# Patient Record
Sex: FEMALE | Race: WHITE | NOT HISPANIC OR LATINO
[De-identification: names, ages, dates, MRNs, and addresses within clinical notes are randomized per-mention and may not be internally consistent; named-entity substitution may affect disease eponyms.]

---

## 2017-12-19 ENCOUNTER — LABORATORY RESULT (OUTPATIENT)
Age: 52
End: 2017-12-19

## 2017-12-19 ENCOUNTER — MOBILE ON CALL (OUTPATIENT)
Age: 52
End: 2017-12-19

## 2017-12-19 ENCOUNTER — APPOINTMENT (OUTPATIENT)
Dept: DERMATOLOGY | Facility: CLINIC | Age: 52
End: 2017-12-19
Payer: COMMERCIAL

## 2017-12-19 VITALS — DIASTOLIC BLOOD PRESSURE: 60 MMHG | SYSTOLIC BLOOD PRESSURE: 112 MMHG

## 2017-12-19 DIAGNOSIS — D48.5 NEOPLASM OF UNCERTAIN BEHAVIOR OF SKIN: ICD-10-CM

## 2017-12-19 DIAGNOSIS — L82.1 OTHER SEBORRHEIC KERATOSIS: ICD-10-CM

## 2017-12-19 DIAGNOSIS — L81.4 OTHER MELANIN HYPERPIGMENTATION: ICD-10-CM

## 2017-12-19 PROCEDURE — 11100 BX SKIN SUBCUTANEOUS&/MUCOUS MEMBRANE 1 LESION: CPT

## 2017-12-19 PROCEDURE — 99214 OFFICE O/P EST MOD 30 MIN: CPT | Mod: 25

## 2018-01-22 ENCOUNTER — APPOINTMENT (OUTPATIENT)
Dept: DERMATOLOGY | Facility: CLINIC | Age: 53
End: 2018-01-22
Payer: COMMERCIAL

## 2018-01-22 PROCEDURE — 99214 OFFICE O/P EST MOD 30 MIN: CPT

## 2018-01-26 ENCOUNTER — TRANSCRIPTION ENCOUNTER (OUTPATIENT)
Age: 53
End: 2018-01-26

## 2018-01-29 ENCOUNTER — APPOINTMENT (OUTPATIENT)
Dept: SPINE | Facility: CLINIC | Age: 53
End: 2018-01-29
Payer: COMMERCIAL

## 2018-01-29 VITALS
HEIGHT: 60 IN | BODY MASS INDEX: 23.16 KG/M2 | WEIGHT: 118 LBS | SYSTOLIC BLOOD PRESSURE: 110 MMHG | DIASTOLIC BLOOD PRESSURE: 60 MMHG

## 2018-01-29 PROCEDURE — 99204 OFFICE O/P NEW MOD 45 MIN: CPT

## 2018-01-29 RX ORDER — BACILLUS COAGULANS/INULIN 1B-250 MG
CAPSULE ORAL
Refills: 0 | Status: ACTIVE | COMMUNITY

## 2018-01-30 ENCOUNTER — MOBILE ON CALL (OUTPATIENT)
Age: 53
End: 2018-01-30

## 2018-01-30 ENCOUNTER — APPOINTMENT (OUTPATIENT)
Dept: DERMATOLOGY | Facility: CLINIC | Age: 53
End: 2018-01-30
Payer: COMMERCIAL

## 2018-01-30 PROCEDURE — 17311 MOHS 1 STAGE H/N/HF/G: CPT

## 2018-01-30 PROCEDURE — 17312 MOHS ADDL STAGE: CPT

## 2018-02-08 ENCOUNTER — CLINICAL ADVICE (OUTPATIENT)
Age: 53
End: 2018-02-08

## 2018-02-13 ENCOUNTER — OUTPATIENT (OUTPATIENT)
Dept: OUTPATIENT SERVICES | Facility: HOSPITAL | Age: 53
LOS: 1 days | End: 2018-02-13
Payer: COMMERCIAL

## 2018-02-13 ENCOUNTER — APPOINTMENT (OUTPATIENT)
Dept: MRI IMAGING | Facility: CLINIC | Age: 53
End: 2018-02-13
Payer: COMMERCIAL

## 2018-02-13 DIAGNOSIS — D49.2 NEOPLASM OF UNSPECIFIED BEHAVIOR OF BONE, SOFT TISSUE, AND SKIN: ICD-10-CM

## 2018-02-13 DIAGNOSIS — Z00.8 ENCOUNTER FOR OTHER GENERAL EXAMINATION: ICD-10-CM

## 2018-02-13 PROCEDURE — A9585: CPT

## 2018-02-13 PROCEDURE — 70546 MR ANGIOGRAPH HEAD W/O&W/DYE: CPT | Mod: 26

## 2018-02-13 PROCEDURE — 70546 MR ANGIOGRAPH HEAD W/O&W/DYE: CPT

## 2018-02-13 PROCEDURE — 70544 MR ANGIOGRAPHY HEAD W/O DYE: CPT

## 2018-02-14 ENCOUNTER — OUTPATIENT (OUTPATIENT)
Dept: OUTPATIENT SERVICES | Facility: HOSPITAL | Age: 53
LOS: 1 days | End: 2018-02-14
Payer: COMMERCIAL

## 2018-02-14 VITALS
WEIGHT: 117.07 LBS | SYSTOLIC BLOOD PRESSURE: 110 MMHG | OXYGEN SATURATION: 98 % | HEART RATE: 78 BPM | RESPIRATION RATE: 16 BRPM | HEIGHT: 60 IN | TEMPERATURE: 97 F | DIASTOLIC BLOOD PRESSURE: 60 MMHG

## 2018-02-14 DIAGNOSIS — D49.7 NEOPLASM OF UNSPECIFIED BEHAVIOR OF ENDOCRINE GLANDS AND OTHER PARTS OF NERVOUS SYSTEM: ICD-10-CM

## 2018-02-14 DIAGNOSIS — R07.9 CHEST PAIN, UNSPECIFIED: ICD-10-CM

## 2018-02-14 DIAGNOSIS — Z98.891 HISTORY OF UTERINE SCAR FROM PREVIOUS SURGERY: Chronic | ICD-10-CM

## 2018-02-14 DIAGNOSIS — Z01.818 ENCOUNTER FOR OTHER PREPROCEDURAL EXAMINATION: ICD-10-CM

## 2018-02-14 DIAGNOSIS — D49.2 NEOPLASM OF UNSPECIFIED BEHAVIOR OF BONE, SOFT TISSUE, AND SKIN: ICD-10-CM

## 2018-02-14 LAB
BLD GP AB SCN SERPL QL: NEGATIVE — SIGNIFICANT CHANGE UP
HCT VFR BLD CALC: 40.4 % — SIGNIFICANT CHANGE UP (ref 34.5–45)
HGB BLD-MCNC: 13.7 G/DL — SIGNIFICANT CHANGE UP (ref 11.5–15.5)
MCHC RBC-ENTMCNC: 29.5 PG — SIGNIFICANT CHANGE UP (ref 27–34)
MCHC RBC-ENTMCNC: 33.9 GM/DL — SIGNIFICANT CHANGE UP (ref 32–36)
MCV RBC AUTO: 86.9 FL — SIGNIFICANT CHANGE UP (ref 80–100)
MRSA PCR RESULT.: SIGNIFICANT CHANGE UP
PLATELET # BLD AUTO: 308 K/UL — SIGNIFICANT CHANGE UP (ref 150–400)
RBC # BLD: 4.65 M/UL — SIGNIFICANT CHANGE UP (ref 3.8–5.2)
RBC # FLD: 13.4 % — SIGNIFICANT CHANGE UP (ref 10.3–14.5)
RH IG SCN BLD-IMP: POSITIVE — SIGNIFICANT CHANGE UP
S AUREUS DNA NOSE QL NAA+PROBE: SIGNIFICANT CHANGE UP
WBC # BLD: 6.68 K/UL — SIGNIFICANT CHANGE UP (ref 3.8–10.5)
WBC # FLD AUTO: 6.68 K/UL — SIGNIFICANT CHANGE UP (ref 3.8–10.5)

## 2018-02-14 PROCEDURE — 86901 BLOOD TYPING SEROLOGIC RH(D): CPT

## 2018-02-14 PROCEDURE — 87641 MR-STAPH DNA AMP PROBE: CPT

## 2018-02-14 PROCEDURE — 86850 RBC ANTIBODY SCREEN: CPT

## 2018-02-14 PROCEDURE — 85027 COMPLETE CBC AUTOMATED: CPT

## 2018-02-14 PROCEDURE — 87640 STAPH A DNA AMP PROBE: CPT

## 2018-02-14 PROCEDURE — 86900 BLOOD TYPING SEROLOGIC ABO: CPT

## 2018-02-14 PROCEDURE — G0463: CPT

## 2018-02-14 NOTE — H&P PST ADULT - HISTORY OF PRESENT ILLNESS
52 year old female with c/o vertigo, headaches, SOB, fatigue, pain under both breasts for the past 2 1/2 months was seen by PCP, ENT and neuro. 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 Now presents for removal of thoracic spinal cord tumor. PCP recommended a cardiology work up for the fatigue SOB and intermittent chest pain which was done on 2/12/18, awaiting results. 52 year old female with c/o vertigo, headaches, SOB, fatigue, pain under both breasts for the past 2 1/2 months was seen by PCP, ENT and neuro. 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 Now presents for removal of thoracic spinal cord tumor. PCP recommended a cardiology work up for the fatigue SOB and intermittent chest pain which was done on 2/12/18, awaiting results.

## 2018-02-14 NOTE — H&P PST ADULT - PMH
Basal cell carcinoma (BCC) in situ of skin  excised from ear in dermatologist office 1/2018  Chest pain  after activity, started in 1/18, had cardiology workup 2/12/18, awaiting results  Diverticulitis  hospitalized in 2010, no further episodes  Dry eye

## 2018-02-14 NOTE — H&P PST ADULT - NSANTHOSAYNRD_GEN_A_CORE
No. KEITH screening performed.  STOP BANG Legend: 0-2 = LOW Risk; 3-4 = INTERMEDIATE Risk; 5-8 = HIGH Risk

## 2018-02-14 NOTE — H&P PST ADULT - PROBLEM SELECTOR PLAN 1
T7-T8 thoracic laminectomy, removal of spinal tumor thoracic laminectomies, removal of T6-T7 spinal tumor

## 2018-02-21 ENCOUNTER — TRANSCRIPTION ENCOUNTER (OUTPATIENT)
Age: 53
End: 2018-02-21

## 2018-02-21 ENCOUNTER — APPOINTMENT (OUTPATIENT)
Dept: SPINE | Facility: HOSPITAL | Age: 53
End: 2018-02-21

## 2018-02-21 ENCOUNTER — APPOINTMENT (OUTPATIENT)
Dept: RADIOLOGY | Facility: HOSPITAL | Age: 53
End: 2018-02-21

## 2018-02-21 ENCOUNTER — INPATIENT (INPATIENT)
Facility: HOSPITAL | Age: 53
LOS: 3 days | Discharge: HOME | DRG: 29 | End: 2018-02-25
Attending: NEUROLOGICAL SURGERY | Admitting: NEUROLOGICAL SURGERY
Payer: COMMERCIAL

## 2018-02-21 ENCOUNTER — RESULT REVIEW (OUTPATIENT)
Age: 53
End: 2018-02-21

## 2018-02-21 VITALS
TEMPERATURE: 98 F | WEIGHT: 117.07 LBS | RESPIRATION RATE: 18 BRPM | HEIGHT: 60 IN | OXYGEN SATURATION: 100 % | DIASTOLIC BLOOD PRESSURE: 61 MMHG | HEART RATE: 63 BPM | SYSTOLIC BLOOD PRESSURE: 101 MMHG

## 2018-02-21 DIAGNOSIS — D49.2 NEOPLASM OF UNSPECIFIED BEHAVIOR OF BONE, SOFT TISSUE, AND SKIN: ICD-10-CM

## 2018-02-21 DIAGNOSIS — Z98.891 HISTORY OF UTERINE SCAR FROM PREVIOUS SURGERY: Chronic | ICD-10-CM

## 2018-02-21 LAB
HCG UR QL: NEGATIVE — SIGNIFICANT CHANGE UP
RH IG SCN BLD-IMP: POSITIVE — SIGNIFICANT CHANGE UP

## 2018-02-21 PROCEDURE — 63281 BX/EXC IDRL SPINE LESN THRC: CPT

## 2018-02-21 PROCEDURE — 88307 TISSUE EXAM BY PATHOLOGIST: CPT | Mod: 26

## 2018-02-21 PROCEDURE — 22899 UNLISTED PROCEDURE SPINE: CPT

## 2018-02-21 PROCEDURE — 77003 FLUOROGUIDE FOR SPINE INJECT: CPT | Mod: 26

## 2018-02-21 PROCEDURE — 72020 X-RAY EXAM OF SPINE 1 VIEW: CPT | Mod: 26

## 2018-02-21 PROCEDURE — 69990 MICROSURGERY ADD-ON: CPT

## 2018-02-21 PROCEDURE — 88360 TUMOR IMMUNOHISTOCHEM/MANUAL: CPT | Mod: 26

## 2018-02-21 RX ORDER — HYDROMORPHONE HYDROCHLORIDE 2 MG/ML
1 INJECTION INTRAMUSCULAR; INTRAVENOUS; SUBCUTANEOUS
Qty: 0 | Refills: 0 | Status: DISCONTINUED | OUTPATIENT
Start: 2018-02-21 | End: 2018-02-23

## 2018-02-21 RX ORDER — SODIUM CHLORIDE 9 MG/ML
3 INJECTION INTRAMUSCULAR; INTRAVENOUS; SUBCUTANEOUS EVERY 8 HOURS
Qty: 0 | Refills: 0 | Status: DISCONTINUED | OUTPATIENT
Start: 2018-02-21 | End: 2018-02-21

## 2018-02-21 RX ORDER — DIAZEPAM 5 MG
5 TABLET ORAL EVERY 6 HOURS
Qty: 0 | Refills: 0 | Status: DISCONTINUED | OUTPATIENT
Start: 2018-02-21 | End: 2018-02-24

## 2018-02-21 RX ORDER — ACETAMINOPHEN 500 MG
650 TABLET ORAL EVERY 6 HOURS
Qty: 0 | Refills: 0 | Status: DISCONTINUED | OUTPATIENT
Start: 2018-02-21 | End: 2018-02-25

## 2018-02-21 RX ORDER — PROCHLORPERAZINE MALEATE 5 MG
6.25 TABLET ORAL ONCE
Qty: 0 | Refills: 0 | Status: COMPLETED | OUTPATIENT
Start: 2018-02-21 | End: 2018-02-21

## 2018-02-21 RX ORDER — OXYCODONE AND ACETAMINOPHEN 5; 325 MG/1; MG/1
1 TABLET ORAL EVERY 4 HOURS
Qty: 0 | Refills: 0 | Status: DISCONTINUED | OUTPATIENT
Start: 2018-02-21 | End: 2018-02-25

## 2018-02-21 RX ORDER — POLYETHYLENE GLYCOL 3350 17 G/17G
17 POWDER, FOR SOLUTION ORAL DAILY
Qty: 0 | Refills: 0 | Status: DISCONTINUED | OUTPATIENT
Start: 2018-02-21 | End: 2018-02-23

## 2018-02-21 RX ORDER — HYDROMORPHONE HYDROCHLORIDE 2 MG/ML
0.5 INJECTION INTRAMUSCULAR; INTRAVENOUS; SUBCUTANEOUS
Qty: 0 | Refills: 0 | Status: DISCONTINUED | OUTPATIENT
Start: 2018-02-21 | End: 2018-02-22

## 2018-02-21 RX ORDER — SENNA PLUS 8.6 MG/1
2 TABLET ORAL AT BEDTIME
Qty: 0 | Refills: 0 | Status: DISCONTINUED | OUTPATIENT
Start: 2018-02-21 | End: 2018-02-25

## 2018-02-21 RX ORDER — FAMOTIDINE 10 MG/ML
20 INJECTION INTRAVENOUS ONCE
Qty: 0 | Refills: 0 | Status: COMPLETED | OUTPATIENT
Start: 2018-02-21 | End: 2018-02-21

## 2018-02-21 RX ORDER — L.ACIDOPH/B.ANIMALIS/B.LONGUM 15B CELL
1 CAPSULE ORAL
Qty: 0 | Refills: 0 | COMMUNITY

## 2018-02-21 RX ORDER — DEXAMETHASONE 0.5 MG/5ML
4 ELIXIR ORAL EVERY 6 HOURS
Qty: 0 | Refills: 0 | Status: COMPLETED | OUTPATIENT
Start: 2018-02-21 | End: 2018-02-22

## 2018-02-21 RX ORDER — LIDOCAINE HCL 20 MG/ML
0.2 VIAL (ML) INJECTION ONCE
Qty: 0 | Refills: 0 | Status: DISCONTINUED | OUTPATIENT
Start: 2018-02-21 | End: 2018-02-21

## 2018-02-21 RX ORDER — ONDANSETRON 8 MG/1
4 TABLET, FILM COATED ORAL ONCE
Qty: 0 | Refills: 0 | Status: COMPLETED | OUTPATIENT
Start: 2018-02-21 | End: 2018-02-21

## 2018-02-21 RX ORDER — DEXAMETHASONE 0.5 MG/5ML
4 ELIXIR ORAL ONCE
Qty: 0 | Refills: 0 | Status: COMPLETED | OUTPATIENT
Start: 2018-02-21 | End: 2018-02-21

## 2018-02-21 RX ORDER — DIPHENHYDRAMINE HCL 50 MG
12.5 CAPSULE ORAL ONCE
Qty: 0 | Refills: 0 | Status: COMPLETED | OUTPATIENT
Start: 2018-02-21 | End: 2018-02-21

## 2018-02-21 RX ORDER — OXYCODONE AND ACETAMINOPHEN 5; 325 MG/1; MG/1
2 TABLET ORAL EVERY 6 HOURS
Qty: 0 | Refills: 0 | Status: DISCONTINUED | OUTPATIENT
Start: 2018-02-21 | End: 2018-02-25

## 2018-02-21 RX ORDER — CEFAZOLIN SODIUM 1 G
2000 VIAL (EA) INJECTION EVERY 8 HOURS
Qty: 0 | Refills: 0 | Status: COMPLETED | OUTPATIENT
Start: 2018-02-21 | End: 2018-02-22

## 2018-02-21 RX ORDER — FAMOTIDINE 10 MG/ML
20 INJECTION INTRAVENOUS EVERY 12 HOURS
Qty: 0 | Refills: 0 | Status: DISCONTINUED | OUTPATIENT
Start: 2018-02-21 | End: 2018-02-25

## 2018-02-21 RX ORDER — LIFITEGRAST 50 MG/ML
1 SOLUTION/ DROPS OPHTHALMIC
Qty: 0 | Refills: 0 | COMMUNITY

## 2018-02-21 RX ORDER — DOCUSATE SODIUM 100 MG
100 CAPSULE ORAL THREE TIMES A DAY
Qty: 0 | Refills: 0 | Status: DISCONTINUED | OUTPATIENT
Start: 2018-02-21 | End: 2018-02-25

## 2018-02-21 RX ORDER — DEXTROSE MONOHYDRATE, SODIUM CHLORIDE, AND POTASSIUM CHLORIDE 50; .745; 4.5 G/1000ML; G/1000ML; G/1000ML
1000 INJECTION, SOLUTION INTRAVENOUS
Qty: 0 | Refills: 0 | Status: DISCONTINUED | OUTPATIENT
Start: 2018-02-21 | End: 2018-02-22

## 2018-02-21 RX ORDER — CEFAZOLIN SODIUM 1 G
2000 VIAL (EA) INJECTION ONCE
Qty: 0 | Refills: 0 | Status: COMPLETED | OUTPATIENT
Start: 2018-02-21 | End: 2018-02-21

## 2018-02-21 RX ORDER — ASCORBIC ACID 60 MG
500 TABLET,CHEWABLE ORAL
Qty: 0 | Refills: 0 | Status: DISCONTINUED | OUTPATIENT
Start: 2018-02-21 | End: 2018-02-25

## 2018-02-21 RX ORDER — MAGNESIUM HYDROXIDE 400 MG/1
30 TABLET, CHEWABLE ORAL EVERY 12 HOURS
Qty: 0 | Refills: 0 | Status: DISCONTINUED | OUTPATIENT
Start: 2018-02-21 | End: 2018-02-25

## 2018-02-21 RX ORDER — ONDANSETRON 8 MG/1
4 TABLET, FILM COATED ORAL EVERY 6 HOURS
Qty: 0 | Refills: 0 | Status: DISCONTINUED | OUTPATIENT
Start: 2018-02-21 | End: 2018-02-25

## 2018-02-21 RX ADMIN — Medication 102.5 MILLIGRAM(S): at 21:50

## 2018-02-21 RX ADMIN — SENNA PLUS 2 TABLET(S): 8.6 TABLET ORAL at 23:58

## 2018-02-21 RX ADMIN — ONDANSETRON 4 MILLIGRAM(S): 8 TABLET, FILM COATED ORAL at 18:56

## 2018-02-21 RX ADMIN — HYDROMORPHONE HYDROCHLORIDE 0.5 MILLIGRAM(S): 2 INJECTION INTRAMUSCULAR; INTRAVENOUS; SUBCUTANEOUS at 22:20

## 2018-02-21 RX ADMIN — Medication 100 MILLIGRAM(S): at 23:57

## 2018-02-21 RX ADMIN — HYDROMORPHONE HYDROCHLORIDE 0.5 MILLIGRAM(S): 2 INJECTION INTRAMUSCULAR; INTRAVENOUS; SUBCUTANEOUS at 22:35

## 2018-02-21 RX ADMIN — ONDANSETRON 4 MILLIGRAM(S): 8 TABLET, FILM COATED ORAL at 19:41

## 2018-02-21 RX ADMIN — DEXTROSE MONOHYDRATE, SODIUM CHLORIDE, AND POTASSIUM CHLORIDE 75 MILLILITER(S): 50; .745; 4.5 INJECTION, SOLUTION INTRAVENOUS at 20:32

## 2018-02-21 RX ADMIN — Medication 12.5 MILLIGRAM(S): at 19:42

## 2018-02-21 RX ADMIN — Medication 4 MILLIGRAM(S): at 20:44

## 2018-02-21 RX ADMIN — FAMOTIDINE 20 MILLIGRAM(S): 10 INJECTION INTRAVENOUS at 20:30

## 2018-02-21 NOTE — BRIEF OPERATIVE NOTE - PROCEDURE
<<-----Click on this checkbox to enter Procedure Laminectomy for excision of intradural extramedullary intraspinal neoplasm  02/21/2018  T6,7,8 lami resection of meningioma  Active  NICK

## 2018-02-22 LAB
ANION GAP SERPL CALC-SCNC: 12 MMOL/L — SIGNIFICANT CHANGE UP (ref 5–17)
BASOPHILS # BLD AUTO: 0 K/UL — SIGNIFICANT CHANGE UP (ref 0–0.2)
BASOPHILS NFR BLD AUTO: 0 % — SIGNIFICANT CHANGE UP (ref 0–2)
BUN SERPL-MCNC: 11 MG/DL — SIGNIFICANT CHANGE UP (ref 7–23)
CALCIUM SERPL-MCNC: 7.6 MG/DL — LOW (ref 8.4–10.5)
CHLORIDE SERPL-SCNC: 108 MMOL/L — SIGNIFICANT CHANGE UP (ref 96–108)
CO2 SERPL-SCNC: 21 MMOL/L — LOW (ref 22–31)
CREAT SERPL-MCNC: 0.67 MG/DL — SIGNIFICANT CHANGE UP (ref 0.5–1.3)
EOSINOPHIL # BLD AUTO: 0 K/UL — SIGNIFICANT CHANGE UP (ref 0–0.5)
EOSINOPHIL NFR BLD AUTO: 0 % — SIGNIFICANT CHANGE UP (ref 0–6)
GLUCOSE SERPL-MCNC: 126 MG/DL — HIGH (ref 70–99)
HCT VFR BLD CALC: 29.6 % — LOW (ref 34.5–45)
HGB BLD-MCNC: 10.4 G/DL — LOW (ref 11.5–15.5)
LYMPHOCYTES # BLD AUTO: 0.7 K/UL — LOW (ref 1–3.3)
LYMPHOCYTES # BLD AUTO: 5.5 % — LOW (ref 13–44)
MCHC RBC-ENTMCNC: 31.6 PG — SIGNIFICANT CHANGE UP (ref 27–34)
MCHC RBC-ENTMCNC: 35.1 GM/DL — SIGNIFICANT CHANGE UP (ref 32–36)
MCV RBC AUTO: 90 FL — SIGNIFICANT CHANGE UP (ref 80–100)
MONOCYTES # BLD AUTO: 0.6 K/UL — SIGNIFICANT CHANGE UP (ref 0–0.9)
MONOCYTES NFR BLD AUTO: 4.5 % — SIGNIFICANT CHANGE UP (ref 2–14)
NEUTROPHILS # BLD AUTO: 11.7 K/UL — HIGH (ref 1.8–7.4)
NEUTROPHILS NFR BLD AUTO: 90 % — HIGH (ref 43–77)
PLATELET # BLD AUTO: 268 K/UL — SIGNIFICANT CHANGE UP (ref 150–400)
POTASSIUM SERPL-MCNC: 4.1 MMOL/L — SIGNIFICANT CHANGE UP (ref 3.5–5.3)
POTASSIUM SERPL-SCNC: 4.1 MMOL/L — SIGNIFICANT CHANGE UP (ref 3.5–5.3)
RBC # BLD: 3.29 M/UL — LOW (ref 3.8–5.2)
RBC # FLD: 12 % — SIGNIFICANT CHANGE UP (ref 10.3–14.5)
SODIUM SERPL-SCNC: 141 MMOL/L — SIGNIFICANT CHANGE UP (ref 135–145)
WBC # BLD: 13 K/UL — HIGH (ref 3.8–10.5)
WBC # FLD AUTO: 13 K/UL — HIGH (ref 3.8–10.5)

## 2018-02-22 RX ORDER — ENOXAPARIN SODIUM 100 MG/ML
40 INJECTION SUBCUTANEOUS EVERY 24 HOURS
Qty: 0 | Refills: 0 | Status: DISCONTINUED | OUTPATIENT
Start: 2018-02-22 | End: 2018-02-25

## 2018-02-22 RX ADMIN — HYDROMORPHONE HYDROCHLORIDE 1 MILLIGRAM(S): 2 INJECTION INTRAMUSCULAR; INTRAVENOUS; SUBCUTANEOUS at 11:15

## 2018-02-22 RX ADMIN — Medication 4 MILLIGRAM(S): at 21:50

## 2018-02-22 RX ADMIN — ENOXAPARIN SODIUM 40 MILLIGRAM(S): 100 INJECTION SUBCUTANEOUS at 12:03

## 2018-02-22 RX ADMIN — Medication 100 MILLIGRAM(S): at 07:01

## 2018-02-22 RX ADMIN — OXYCODONE AND ACETAMINOPHEN 2 TABLET(S): 5; 325 TABLET ORAL at 15:54

## 2018-02-22 RX ADMIN — OXYCODONE AND ACETAMINOPHEN 2 TABLET(S): 5; 325 TABLET ORAL at 21:49

## 2018-02-22 RX ADMIN — HYDROMORPHONE HYDROCHLORIDE 1 MILLIGRAM(S): 2 INJECTION INTRAMUSCULAR; INTRAVENOUS; SUBCUTANEOUS at 01:45

## 2018-02-22 RX ADMIN — Medication 100 MILLIGRAM(S): at 00:05

## 2018-02-22 RX ADMIN — OXYCODONE AND ACETAMINOPHEN 2 TABLET(S): 5; 325 TABLET ORAL at 16:30

## 2018-02-22 RX ADMIN — Medication 5 MILLIGRAM(S): at 00:05

## 2018-02-22 RX ADMIN — Medication 500 MILLIGRAM(S): at 17:56

## 2018-02-22 RX ADMIN — HYDROMORPHONE HYDROCHLORIDE 1 MILLIGRAM(S): 2 INJECTION INTRAMUSCULAR; INTRAVENOUS; SUBCUTANEOUS at 11:00

## 2018-02-22 RX ADMIN — HYDROMORPHONE HYDROCHLORIDE 1 MILLIGRAM(S): 2 INJECTION INTRAMUSCULAR; INTRAVENOUS; SUBCUTANEOUS at 06:40

## 2018-02-22 RX ADMIN — HYDROMORPHONE HYDROCHLORIDE 1 MILLIGRAM(S): 2 INJECTION INTRAMUSCULAR; INTRAVENOUS; SUBCUTANEOUS at 02:00

## 2018-02-22 RX ADMIN — Medication 4 MILLIGRAM(S): at 07:01

## 2018-02-22 RX ADMIN — Medication 4 MILLIGRAM(S): at 13:18

## 2018-02-22 RX ADMIN — HYDROMORPHONE HYDROCHLORIDE 1 MILLIGRAM(S): 2 INJECTION INTRAMUSCULAR; INTRAVENOUS; SUBCUTANEOUS at 06:25

## 2018-02-22 RX ADMIN — Medication 4 MILLIGRAM(S): at 02:39

## 2018-02-22 RX ADMIN — Medication 500 MILLIGRAM(S): at 06:33

## 2018-02-22 RX ADMIN — Medication 100 MILLIGRAM(S): at 21:49

## 2018-02-22 RX ADMIN — Medication 5 MILLIGRAM(S): at 05:37

## 2018-02-22 RX ADMIN — Medication 100 MILLIGRAM(S): at 13:18

## 2018-02-22 RX ADMIN — SENNA PLUS 2 TABLET(S): 8.6 TABLET ORAL at 21:49

## 2018-02-22 NOTE — PROGRESS NOTE ADULT - SUBJECTIVE AND OBJECTIVE BOX
HPI:  Patient is a 52 year old female who was found to have a T7-T8 spinal cord tumor on recent MRI.  Presents for surgery.    OVERNIGHT EVENTS:  No acute events overnight.  Having some incisional pain which is controlled on current regimen.  Patient in pacu, awaiting floor bed.  Has not been out of bed yet.  To start diet this AM.  Awaiting MRI spine.    Vital Signs Last 24 Hrs  T(C): 36.8 (22 Feb 2018 07:30), Max: 37 (21 Feb 2018 22:00)  T(F): 98.2 (22 Feb 2018 07:30), Max: 98.6 (21 Feb 2018 22:00)  HR: 64 (22 Feb 2018 07:30) (61 - 86)  BP: 99/55 (22 Feb 2018 07:30) (73/36 - 107/53)  BP(mean): 69 (22 Feb 2018 07:30) (46 - 77)  RR: 15 (22 Feb 2018 07:30) (13 - 16)  SpO2: 100% (22 Feb 2018 07:30) (98% - 100%)      PHYSICAL EXAM:  Neurological: awake, alert, oriented x3, follows commands, speech clear and fluent, moves all extremities x4 w/ 5/5 strength throughout, sensation present, intact, equal throughout    Cardiovascular: +s1, s2  Respiratory: clear to auscultation b/l  Gastrointestinal: soft, non-distended, non-tender  Genitourinary: +li  Extremities: +dp/pt pulses palpable b/l  Incision/Wound: posterior vertical midline incision c/d/i w/ dermabond/prineo    TUBES/LINES:  [x] +li    DIET:  [x] clear liquids    LABS:                        10.4   13.0  )-----------( 268      ( 22 Feb 2018 02:42 )             29.6     02-22    141  |  108  |  11  ----------------------------<  126<H>  4.1   |  21<L>  |  0.67    Ca    7.6<L>      22 Feb 2018 02:42      Allergies    No Known Allergies      MEDICATIONS:  Antibiotics: none    Neuro:  acetaminophen   Tablet 650 milliGRAM(s) Oral every 6 hours PRN  acetaminophen   Tablet. 650 milliGRAM(s) Oral every 6 hours PRN  diazepam    Tablet 5 milliGRAM(s) Oral every 6 hours  HYDROmorphone  Injectable 1 milliGRAM(s) SubCutaneous every 3 hours PRN  HYDROmorphone  Injectable 0.5 milliGRAM(s) IV Push every 10 minutes PRN  ondansetron Injectable 4 milliGRAM(s) IV Push every 6 hours PRN  oxyCODONE    5 mG/acetaminophen 325 mG 1 Tablet(s) Oral every 4 hours PRN  oxyCODONE    5 mG/acetaminophen 325 mG 2 Tablet(s) Oral every 6 hours PRN    Anticoagulation:  enoxaparin Injectable 40 milliGRAM(s) SubCutaneous every 24 hours    OTHER:  dexamethasone     Tablet 4 milliGRAM(s) Oral every 6 hours  docusate sodium 100 milliGRAM(s) Oral three times a day  famotidine    Tablet 20 milliGRAM(s) Oral every 12 hours PRN  magnesium hydroxide Suspension 30 milliLiter(s) Oral every 12 hours PRN  polyethylene glycol 3350 17 Gram(s) Oral daily PRN  senna 2 Tablet(s) Oral at bedtime    IVF:  ascorbic acid 500 milliGRAM(s) Oral two times a day  multivitamin 1 Tablet(s) Oral daily  sodium chloride 0.9% with potassium chloride 20 mEq/L 1000 milliLiter(s) IV Continuous <Continuous>    CULTURES:    RADIOLOGY & ADDITIONAL TESTS: HPI:  Patient is a 52 year old female who was found to have a T6-T7 spinal cord tumor on recent MRI.  Presents for surgery.    OVERNIGHT EVENTS:  No acute events overnight.  Having some incisional pain which is controlled on current regimen.  Patient in pacu, awaiting floor bed.  Has not been out of bed yet.  To start diet this AM.  Awaiting MRI spine.    Vital Signs Last 24 Hrs  T(C): 36.8 (22 Feb 2018 07:30), Max: 37 (21 Feb 2018 22:00)  T(F): 98.2 (22 Feb 2018 07:30), Max: 98.6 (21 Feb 2018 22:00)  HR: 64 (22 Feb 2018 07:30) (61 - 86)  BP: 99/55 (22 Feb 2018 07:30) (73/36 - 107/53)  BP(mean): 69 (22 Feb 2018 07:30) (46 - 77)  RR: 15 (22 Feb 2018 07:30) (13 - 16)  SpO2: 100% (22 Feb 2018 07:30) (98% - 100%)      PHYSICAL EXAM:  Neurological: awake, alert, oriented x3, follows commands, speech clear and fluent, moves all extremities x4 w/ 5/5 strength throughout, sensation present, intact, equal throughout    Cardiovascular: +s1, s2  Respiratory: clear to auscultation b/l  Gastrointestinal: soft, non-distended, non-tender  Genitourinary: +li  Extremities: +dp/pt pulses palpable b/l  Incision/Wound: posterior vertical midline incision c/d/i w/ dermabond/prineo    TUBES/LINES:  [x] +li    DIET:  [x] clear liquids    LABS:                        10.4   13.0  )-----------( 268      ( 22 Feb 2018 02:42 )             29.6     02-22    141  |  108  |  11  ----------------------------<  126<H>  4.1   |  21<L>  |  0.67    Ca    7.6<L>      22 Feb 2018 02:42      Allergies    No Known Allergies      MEDICATIONS:  Antibiotics: none    Neuro:  acetaminophen   Tablet 650 milliGRAM(s) Oral every 6 hours PRN  acetaminophen   Tablet. 650 milliGRAM(s) Oral every 6 hours PRN  diazepam    Tablet 5 milliGRAM(s) Oral every 6 hours  HYDROmorphone  Injectable 1 milliGRAM(s) SubCutaneous every 3 hours PRN  HYDROmorphone  Injectable 0.5 milliGRAM(s) IV Push every 10 minutes PRN  ondansetron Injectable 4 milliGRAM(s) IV Push every 6 hours PRN  oxyCODONE    5 mG/acetaminophen 325 mG 1 Tablet(s) Oral every 4 hours PRN  oxyCODONE    5 mG/acetaminophen 325 mG 2 Tablet(s) Oral every 6 hours PRN    Anticoagulation:  enoxaparin Injectable 40 milliGRAM(s) SubCutaneous every 24 hours    OTHER:  dexamethasone     Tablet 4 milliGRAM(s) Oral every 6 hours  docusate sodium 100 milliGRAM(s) Oral three times a day  famotidine    Tablet 20 milliGRAM(s) Oral every 12 hours PRN  magnesium hydroxide Suspension 30 milliLiter(s) Oral every 12 hours PRN  polyethylene glycol 3350 17 Gram(s) Oral daily PRN  senna 2 Tablet(s) Oral at bedtime    IVF:  ascorbic acid 500 milliGRAM(s) Oral two times a day  multivitamin 1 Tablet(s) Oral daily  sodium chloride 0.9% with potassium chloride 20 mEq/L 1000 milliLiter(s) IV Continuous <Continuous>    CULTURES:    RADIOLOGY & ADDITIONAL TESTS:

## 2018-02-22 NOTE — PROGRESS NOTE ADULT - ATTENDING COMMENTS
I saw and evaluated the patient. The patient is a 56-year-old female s/p thoracic laminectomies for resection of a T7-T8 right intradural extramedullary spinal tumor on 2/22/18. The patient is currently neurologically stable. F/U pathology. Recommend a post-operative baseline MRI of the thoracic spine with and without contrast. D/C Reddy catheter when more ambulatory/OOB and F/U TOV. Begin mobilization OOB with PT and disposition planning. I saw and evaluated the patient. The patient is a 56-year-old female s/p thoracic laminectomies for resection of a T6-T7 right intradural extramedullary spinal tumor on 2/22/18. The patient is currently neurologically stable. F/U pathology. Recommend a post-operative baseline MRI of the thoracic spine with and without contrast. D/C Reddy catheter when more ambulatory/OOB and F/U TOV. Begin mobilization OOB with PT and disposition planning.

## 2018-02-22 NOTE — PHYSICAL THERAPY INITIAL EVALUATION ADULT - PERTINENT HX OF CURRENT PROBLEM, REHAB EVAL
52 year old female with c/o vertigo, headaches, SOB, fatigue, pain under both breasts for the past 2 1/2 months was seen by PCP, ENT and neuro. 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   pt is now POD1 s/p the above surgery.

## 2018-02-22 NOTE — PROGRESS NOTE ADULT - ASSESSMENT
HPI:  Patient is a 52 year old female who was found to have a T7-T8 spinal cord tumor on recent MRI.  Presents for surgery.    PROCEDURE: s/p T6-T8 laminectomies for excision of spinal tumor on 2/21/2018   POD#1    PLAN:  -dilaudid and percocet for pain control  -valium for muscle spasms  -senna, miralax, magnesium hydroxide, and colace for bowel regimen  -lovenox and SCDs for DVT prophylaxis  -mri spine pending  -d/c li, trial of void  -incentive spirometer for lung expansion  -out of bed with assistance  -pt eval pending  -am labs    Spectra #[id number] HPI:  Patient is a 52 year old female who was found to have a T6-T7 spinal cord tumor on recent MRI.  Presents for surgery.    PROCEDURE: s/p thoracic laminectomies for excision of T6-T7 spinal tumor on 2/21/2018   POD#1    PLAN:  -dilaudid and percocet for pain control  -valium for muscle spasms  -senna, miralax, magnesium hydroxide, and colace for bowel regimen  -lovenox and SCDs for DVT prophylaxis  -mri spine pending  -d/c li, trial of void  -incentive spirometer for lung expansion  -out of bed with assistance  -pt eval pending  -am labs    Spectra #[id number]

## 2018-02-23 LAB
ANION GAP SERPL CALC-SCNC: 11 MMOL/L — SIGNIFICANT CHANGE UP (ref 5–17)
BUN SERPL-MCNC: 9 MG/DL — SIGNIFICANT CHANGE UP (ref 7–23)
CALCIUM SERPL-MCNC: 8.3 MG/DL — LOW (ref 8.4–10.5)
CHLORIDE SERPL-SCNC: 106 MMOL/L — SIGNIFICANT CHANGE UP (ref 96–108)
CO2 SERPL-SCNC: 24 MMOL/L — SIGNIFICANT CHANGE UP (ref 22–31)
CREAT SERPL-MCNC: 0.61 MG/DL — SIGNIFICANT CHANGE UP (ref 0.5–1.3)
GLUCOSE SERPL-MCNC: 122 MG/DL — HIGH (ref 70–99)
HCT VFR BLD CALC: 29.2 % — LOW (ref 34.5–45)
HGB BLD-MCNC: 9.6 G/DL — LOW (ref 11.5–15.5)
MCHC RBC-ENTMCNC: 29.9 PG — SIGNIFICANT CHANGE UP (ref 27–34)
MCHC RBC-ENTMCNC: 32.8 GM/DL — SIGNIFICANT CHANGE UP (ref 32–36)
MCV RBC AUTO: 91.2 FL — SIGNIFICANT CHANGE UP (ref 80–100)
PLATELET # BLD AUTO: 243 K/UL — SIGNIFICANT CHANGE UP (ref 150–400)
POTASSIUM SERPL-MCNC: 4.1 MMOL/L — SIGNIFICANT CHANGE UP (ref 3.5–5.3)
POTASSIUM SERPL-SCNC: 4.1 MMOL/L — SIGNIFICANT CHANGE UP (ref 3.5–5.3)
RBC # BLD: 3.2 M/UL — LOW (ref 3.8–5.2)
RBC # FLD: 12.2 % — SIGNIFICANT CHANGE UP (ref 10.3–14.5)
SODIUM SERPL-SCNC: 141 MMOL/L — SIGNIFICANT CHANGE UP (ref 135–145)
WBC # BLD: 15.6 K/UL — HIGH (ref 3.8–10.5)
WBC # FLD AUTO: 15.6 K/UL — HIGH (ref 3.8–10.5)

## 2018-02-23 PROCEDURE — 72157 MRI CHEST SPINE W/O & W/DYE: CPT | Mod: 26

## 2018-02-23 RX ORDER — POLYETHYLENE GLYCOL 3350 17 G/17G
17 POWDER, FOR SOLUTION ORAL DAILY
Qty: 0 | Refills: 0 | Status: DISCONTINUED | OUTPATIENT
Start: 2018-02-23 | End: 2018-02-25

## 2018-02-23 RX ORDER — SODIUM CHLORIDE 9 MG/ML
1000 INJECTION INTRAMUSCULAR; INTRAVENOUS; SUBCUTANEOUS ONCE
Qty: 0 | Refills: 0 | Status: COMPLETED | OUTPATIENT
Start: 2018-02-23 | End: 2018-02-23

## 2018-02-23 RX ORDER — SODIUM CHLORIDE 9 MG/ML
1000 INJECTION INTRAMUSCULAR; INTRAVENOUS; SUBCUTANEOUS ONCE
Qty: 0 | Refills: 0 | Status: DISCONTINUED | OUTPATIENT
Start: 2018-02-23 | End: 2018-02-23

## 2018-02-23 RX ADMIN — ONDANSETRON 4 MILLIGRAM(S): 8 TABLET, FILM COATED ORAL at 17:10

## 2018-02-23 RX ADMIN — OXYCODONE AND ACETAMINOPHEN 2 TABLET(S): 5; 325 TABLET ORAL at 15:37

## 2018-02-23 RX ADMIN — Medication 100 MILLIGRAM(S): at 21:07

## 2018-02-23 RX ADMIN — Medication 650 MILLIGRAM(S): at 21:07

## 2018-02-23 RX ADMIN — SENNA PLUS 2 TABLET(S): 8.6 TABLET ORAL at 21:07

## 2018-02-23 RX ADMIN — OXYCODONE AND ACETAMINOPHEN 2 TABLET(S): 5; 325 TABLET ORAL at 04:19

## 2018-02-23 RX ADMIN — ENOXAPARIN SODIUM 40 MILLIGRAM(S): 100 INJECTION SUBCUTANEOUS at 12:01

## 2018-02-23 RX ADMIN — POLYETHYLENE GLYCOL 3350 17 GRAM(S): 17 POWDER, FOR SOLUTION ORAL at 13:05

## 2018-02-23 RX ADMIN — Medication 100 MILLIGRAM(S): at 12:01

## 2018-02-23 RX ADMIN — Medication 1 TABLET(S): at 12:01

## 2018-02-23 RX ADMIN — Medication 100 MILLIGRAM(S): at 05:39

## 2018-02-23 RX ADMIN — SODIUM CHLORIDE 1000 MILLILITER(S): 9 INJECTION INTRAMUSCULAR; INTRAVENOUS; SUBCUTANEOUS at 00:37

## 2018-02-23 RX ADMIN — Medication 500 MILLIGRAM(S): at 05:39

## 2018-02-23 RX ADMIN — OXYCODONE AND ACETAMINOPHEN 2 TABLET(S): 5; 325 TABLET ORAL at 16:10

## 2018-02-23 NOTE — PROGRESS NOTE ADULT - SUBJECTIVE AND OBJECTIVE BOX
SUBJECTIVE: Pt seen and examined    OVERNIGHT EVENTS:     Vital Signs Last 24 Hrs  T(C): 36.8 (23 Feb 2018 10:39), Max: 36.9 (22 Feb 2018 14:35)  T(F): 98.2 (23 Feb 2018 10:39), Max: 98.4 (22 Feb 2018 14:35)  HR: 76 (23 Feb 2018 10:39) (67 - 80)  BP: 101/70 (23 Feb 2018 10:39) (89/50 - 110/66)  BP(mean): --  RR: 17 (23 Feb 2018 10:39) (16 - 18)  SpO2: 97% (23 Feb 2018 10:39) (96% - 99%)    PHYSICAL EXAM:    General: No Acute Distress     Neurological: Awake, alert oriented to person, place and time, Following Commands, PERRL, EOMI, Face Symmetrical, Speech Fluent, Moving all extremities, Muscle Strength normal in all four extremities, No Drift, Sensation to Light Touch Intact    Pulmonary: Clear to Auscultation, No Rales, No Rhonchi, No Wheezes     Cardiovascular: S1, S2, Regular Rate and Rhythm     Gastrointestinal: Soft, Nontender, Nondistended     Extremities: No calf tenderness     Incision:     LABS:                        9.6    15.6  )-----------( 243      ( 23 Feb 2018 06:17 )             29.2    02-23    141  |  106  |  9   ----------------------------<  122<H>  4.1   |  24  |  0.61    Ca    8.3<L>      23 Feb 2018 06:17          02-22 @ 07:01 - 02-23 @ 07:00  --------------------------------------------------------  IN: 1555 mL / OUT: 1825 mL / NET: -270 mL    02-23 @ 07:01  - 02-23 @ 13:02  --------------------------------------------------------  IN: 340 mL / OUT: 0 mL / NET: 340 mL      DRAINS:     MEDICATIONS:  Antibiotics:    Neuro:  acetaminophen   Tablet 650 milliGRAM(s) Oral every 6 hours PRN For Temp greater than 38 C (100.4 F)  acetaminophen   Tablet. 650 milliGRAM(s) Oral every 6 hours PRN Mild Pain (1 - 3)  diazepam    Tablet 5 milliGRAM(s) Oral every 6 hours  ondansetron Injectable 4 milliGRAM(s) IV Push every 6 hours PRN Nausea  oxyCODONE    5 mG/acetaminophen 325 mG 1 Tablet(s) Oral every 4 hours PRN Moderate Pain  oxyCODONE    5 mG/acetaminophen 325 mG 2 Tablet(s) Oral every 6 hours PRN Severe Pain    Cardiac:    Pulm:    GI/:  docusate sodium 100 milliGRAM(s) Oral three times a day  famotidine    Tablet 20 milliGRAM(s) Oral every 12 hours PRN Dyspepsia  magnesium hydroxide Suspension 30 milliLiter(s) Oral every 12 hours PRN Constipation  polyethylene glycol 3350 17 Gram(s) Oral daily  senna 2 Tablet(s) Oral at bedtime    Other:   ascorbic acid 500 milliGRAM(s) Oral two times a day  enoxaparin Injectable 40 milliGRAM(s) SubCutaneous every 24 hours  multivitamin 1 Tablet(s) Oral daily    DIET: [] Regular [] CCD [] Renal [] Puree [] Dysphagia [] Tube Feeds:     IMAGING: SUBJECTIVE: Pt seen and examined, reports she is constipated     OVERNIGHT EVENTS: none    Vital Signs Last 24 Hrs  T(C): 36.8 (23 Feb 2018 10:39), Max: 36.9 (22 Feb 2018 14:35)  T(F): 98.2 (23 Feb 2018 10:39), Max: 98.4 (22 Feb 2018 14:35)  HR: 76 (23 Feb 2018 10:39) (67 - 80)  BP: 101/70 (23 Feb 2018 10:39) (89/50 - 110/66)  BP(mean): --  RR: 17 (23 Feb 2018 10:39) (16 - 18)  SpO2: 97% (23 Feb 2018 10:39) (96% - 99%)    PHYSICAL EXAM:    General: No Acute Distress     Neurological: Awake, alert oriented to person, place and time, Following Commands, PERRL, EOMI, Face Symmetrical, Speech Fluent, Moving all extremities, Muscle Strength normal in all four extremities, No Drift, Sensation to Light Touch Intact    Pulmonary: Clear to Auscultation, No Rales, No Rhonchi, No Wheezes     Cardiovascular: S1, S2, Regular Rate and Rhythm     Gastrointestinal: Soft, Nontender, Nondistended     Extremities: No calf tenderness     Incision: dressing c/d/i    LABS:                        9.6    15.6  )-----------( 243      ( 23 Feb 2018 06:17 )             29.2    02-23    141  |  106  |  9   ----------------------------<  122<H>  4.1   |  24  |  0.61    Ca    8.3<L>      23 Feb 2018 06:17          02-22 @ 07:01  -  02-23 @ 07:00  --------------------------------------------------------  IN: 1555 mL / OUT: 1825 mL / NET: -270 mL    02-23 @ 07:01  - 02-23 @ 13:02  --------------------------------------------------------  IN: 340 mL / OUT: 0 mL / NET: 340 mL      DRAINS: none    MEDICATIONS:  Antibiotics:    Neuro:  acetaminophen   Tablet 650 milliGRAM(s) Oral every 6 hours PRN For Temp greater than 38 C (100.4 F)  acetaminophen   Tablet. 650 milliGRAM(s) Oral every 6 hours PRN Mild Pain (1 - 3)  diazepam    Tablet 5 milliGRAM(s) Oral every 6 hours  ondansetron Injectable 4 milliGRAM(s) IV Push every 6 hours PRN Nausea  oxyCODONE    5 mG/acetaminophen 325 mG 1 Tablet(s) Oral every 4 hours PRN Moderate Pain  oxyCODONE    5 mG/acetaminophen 325 mG 2 Tablet(s) Oral every 6 hours PRN Severe Pain    Cardiac:    Pulm:    GI/:  docusate sodium 100 milliGRAM(s) Oral three times a day  famotidine    Tablet 20 milliGRAM(s) Oral every 12 hours PRN Dyspepsia  magnesium hydroxide Suspension 30 milliLiter(s) Oral every 12 hours PRN Constipation  polyethylene glycol 3350 17 Gram(s) Oral daily  senna 2 Tablet(s) Oral at bedtime    Other:   ascorbic acid 500 milliGRAM(s) Oral two times a day  enoxaparin Injectable 40 milliGRAM(s) SubCutaneous every 24 hours  multivitamin 1 Tablet(s) Oral daily    DIET: [x] Regular [] CCD [] Renal [] Puree [] Dysphagia [] Tube Feeds:     IMAGING:   < from: MR Thoracic Spine w/wo IV Cont (02.23.18 @ 09:20) >  Impression: T4-T7 laminectomy with dorsal postoperative changes with   minimal mass effect on the dorsalthecal sac without cord compression.   Normal postoperative enhancement.    < end of copied text >

## 2018-02-23 NOTE — PROGRESS NOTE ADULT - ATTENDING COMMENTS
I saw and evaluated the patient. The patient is a 56-year-old female s/p thoracic laminectomies for resection of a T7-T8 right intradural extramedullary spinal tumor on 2/22/18. The patient is currently neurologically stable. F/U pathology. Continue mobilization OOB with PT and disposition planning. I saw and evaluated the patient. The patient is a 56-year-old female s/p thoracic laminectomies for resection of a T6-T7 right intradural extramedullary spinal tumor on 2/22/18. The patient is currently neurologically stable. F/U pathology. Continue mobilization OOB with PT and disposition planning.

## 2018-02-23 NOTE — PROGRESS NOTE ADULT - ASSESSMENT
52 year old female with c/o vertigo, headaches, SOB, fatigue, pain under both breasts for the past 2 1/2 months was seen by PCP, ENT and neuro. 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 Now presents for removal of thoracic spinal cord tumor. PCP recommended a cardiology work up for the fatigue SOB and intermittent chest pain which was done on 2/12/18, awaiting results.     PROCEDURE: 2/21 s/p T6-T8 laminectomy and removal of meningioma     POD#2    PLAN:  Neuro:   - MRI T spine- post op changes, no cord compression  - pain control- continue percocet prn, valium prn spasm  Respiratory:   - encouraged incentive spirometry  CV:  -BP -stable  DVT ppx:   - venodynes, chemoprophylaxis  GI:    - tolerating po diet  - continue bowel regemin, added miralax for constipation  PT/OT:   - outpatient PT  Discussed with Dr Roman Hernandezink # [id number]

## 2018-02-24 PROCEDURE — 74018 RADEX ABDOMEN 1 VIEW: CPT | Mod: 26

## 2018-02-24 PROCEDURE — 74177 CT ABD & PELVIS W/CONTRAST: CPT | Mod: 26

## 2018-02-24 RX ORDER — MINERAL OIL
133 OIL (ML) MISCELLANEOUS ONCE
Qty: 0 | Refills: 0 | Status: COMPLETED | OUTPATIENT
Start: 2018-02-24 | End: 2018-02-24

## 2018-02-24 RX ORDER — MULTIVIT WITH MIN/MFOLATE/K2 340-15/3 G
300 POWDER (GRAM) ORAL ONCE
Qty: 0 | Refills: 0 | Status: COMPLETED | OUTPATIENT
Start: 2018-02-24 | End: 2018-02-24

## 2018-02-24 RX ORDER — SODIUM CHLORIDE 9 MG/ML
1000 INJECTION INTRAMUSCULAR; INTRAVENOUS; SUBCUTANEOUS
Qty: 0 | Refills: 0 | Status: DISCONTINUED | OUTPATIENT
Start: 2018-02-24 | End: 2018-02-25

## 2018-02-24 RX ORDER — DEXAMETHASONE 0.5 MG/5ML
4 ELIXIR ORAL EVERY 6 HOURS
Qty: 0 | Refills: 0 | Status: DISCONTINUED | OUTPATIENT
Start: 2018-02-24 | End: 2018-02-25

## 2018-02-24 RX ORDER — DEXAMETHASONE 0.5 MG/5ML
10 ELIXIR ORAL ONCE
Qty: 0 | Refills: 0 | Status: COMPLETED | OUTPATIENT
Start: 2018-02-24 | End: 2018-02-24

## 2018-02-24 RX ADMIN — Medication 30 MILLILITER(S): at 00:17

## 2018-02-24 RX ADMIN — Medication 133 MILLILITER(S): at 01:40

## 2018-02-24 RX ADMIN — Medication 102 MILLIGRAM(S): at 18:03

## 2018-02-24 RX ADMIN — Medication 1 TABLET(S): at 11:28

## 2018-02-24 RX ADMIN — Medication 650 MILLIGRAM(S): at 18:30

## 2018-02-24 RX ADMIN — Medication 300 MILLILITER(S): at 11:47

## 2018-02-24 RX ADMIN — ENOXAPARIN SODIUM 40 MILLIGRAM(S): 100 INJECTION SUBCUTANEOUS at 11:28

## 2018-02-24 RX ADMIN — Medication 650 MILLIGRAM(S): at 08:35

## 2018-02-24 RX ADMIN — Medication 650 MILLIGRAM(S): at 09:00

## 2018-02-24 RX ADMIN — SODIUM CHLORIDE 50 MILLILITER(S): 9 INJECTION INTRAMUSCULAR; INTRAVENOUS; SUBCUTANEOUS at 22:31

## 2018-02-24 RX ADMIN — POLYETHYLENE GLYCOL 3350 17 GRAM(S): 17 POWDER, FOR SOLUTION ORAL at 11:28

## 2018-02-24 RX ADMIN — SODIUM CHLORIDE 50 MILLILITER(S): 9 INJECTION INTRAMUSCULAR; INTRAVENOUS; SUBCUTANEOUS at 08:27

## 2018-02-24 RX ADMIN — Medication 650 MILLIGRAM(S): at 18:04

## 2018-02-24 RX ADMIN — Medication 100 MILLIGRAM(S): at 22:29

## 2018-02-24 RX ADMIN — SENNA PLUS 2 TABLET(S): 8.6 TABLET ORAL at 22:29

## 2018-02-24 NOTE — PROGRESS NOTE ADULT - SUBJECTIVE AND OBJECTIVE BOX
SUBJECTIVE: Pt seen and examined. Reports she has not had a bowel movement since admission. She c/o abdominal discomfort, abdominal xray done overnight, no bowel obstruction seen, vomited x 1 overnight, no vomiting today, received enema last night with no results, is currently getting colace, senna, miralax, given magnesium sulfate today. abdomen is soft, nondistended with good bowel sounds    OVERNIGHT EVENTS: see above    Vital Signs Last 24 Hrs  T(C): 36.7 (24 Feb 2018 08:13), Max: 37.1 (23 Feb 2018 16:01)  T(F): 98.1 (24 Feb 2018 08:13), Max: 98.8 (23 Feb 2018 16:01)  HR: 72 (24 Feb 2018 08:13) (60 - 79)  BP: 99/63 (24 Feb 2018 08:13) (91/60 - 110/67)  BP(mean): --  RR: 18 (24 Feb 2018 08:13) (18 - 18)  SpO2: 95% (24 Feb 2018 08:13) (95% - 100%)    PHYSICAL EXAM:    General: No Acute Distress     Neurological: Awake, alert oriented to person, place and time, Following Commands, PERRL, EOMI, Face Symmetrical, Speech Fluent, Moving all extremities, Muscle Strength normal in all four extremities, No Drift, Sensation to Light Touch Intact    Pulmonary: Clear to Auscultation, No Rales, No Rhonchi, No Wheezes     Cardiovascular: S1, S2, Regular Rate and Rhythm     Gastrointestinal: Soft, Nontender, Nondistended, + bowel sounds    Extremities: No calf tenderness     Incision: staples c/di    LABS:                        9.6    15.6  )-----------( 243      ( 23 Feb 2018 06:17 )             29.2    02-23    141  |  106  |  9   ----------------------------<  122<H>  4.1   |  24  |  0.61    Ca    8.3<L>      23 Feb 2018 06:17          02-23 @ 07:01  -  02-24 @ 07:00  --------------------------------------------------------  IN: 1080 mL / OUT: 525 mL / NET: 555 mL      DRAINS: none    MEDICATIONS:  Antibiotics:    Neuro:  acetaminophen   Tablet 650 milliGRAM(s) Oral every 6 hours PRN For Temp greater than 38 C (100.4 F)  acetaminophen   Tablet. 650 milliGRAM(s) Oral every 6 hours PRN Mild Pain (1 - 3)  diazepam    Tablet 5 milliGRAM(s) Oral three times a day PRN muscle  ondansetron Injectable 4 milliGRAM(s) IV Push every 6 hours PRN Nausea  oxyCODONE    5 mG/acetaminophen 325 mG 1 Tablet(s) Oral every 4 hours PRN Moderate Pain  oxyCODONE    5 mG/acetaminophen 325 mG 2 Tablet(s) Oral every 6 hours PRN Severe Pain    Cardiac:    Pulm:    GI/:  aluminum hydroxide/magnesium hydroxide/simethicone Suspension 30 milliLiter(s) Oral every 4 hours PRN Dyspepsia  docusate sodium 100 milliGRAM(s) Oral three times a day  famotidine    Tablet 20 milliGRAM(s) Oral every 12 hours PRN Dyspepsia  magnesium hydroxide Suspension 30 milliLiter(s) Oral every 12 hours PRN Constipation  polyethylene glycol 3350 17 Gram(s) Oral daily  senna 2 Tablet(s) Oral at bedtime    Other:   ascorbic acid 500 milliGRAM(s) Oral two times a day  enoxaparin Injectable 40 milliGRAM(s) SubCutaneous every 24 hours  multivitamin 1 Tablet(s) Oral daily  sodium chloride 0.9%. 1000 milliLiter(s) IV Continuous <Continuous>    DIET: [] Regular [] CCD [] Renal [] Puree [] Dysphagia [] Tube Feeds:     IMAGING:   < from: MR Thoracic Spine w/wo IV Cont (02.23.18 @ 09:20) >  Impression: T4-T7 laminectomy with dorsal postoperative changes with   minimal mass effect on the dorsalthecal sac without cord compression.   Normal postoperative enhancement.    < end of copied text >

## 2018-02-24 NOTE — PROGRESS NOTE ADULT - ATTENDING COMMENTS
I saw and evaluated the patient. The patient is a 56-year-old female s/p thoracic laminectomies for resection of a T6-T7 right intradural extramedullary spinal tumor on 2/22/18. The patient is currently neurologically stable. F/U pathology. Continue mobilization OOB with PT and disposition planning home with outpatient PT.

## 2018-02-24 NOTE — PROVIDER CONTACT NOTE (OTHER) - ASSESSMENT
pt has positive bowel sounds. pt stomach is soft and non distended. hasn't had bm since2/19. pt
pt stomach nondistended and soft, pt has positive bowl sounds.

## 2018-02-24 NOTE — PROVIDER CONTACT NOTE (OTHER) - ACTION/TREATMENT ORDERED:
enema ordered and given, Maalox order and given. will cont to monitor.
provider saw and assessed pt. abd x-ray ordered and pt ordered to be npo. will cont to monitor.

## 2018-02-24 NOTE — PROGRESS NOTE ADULT - ASSESSMENT
52 year old female with c/o vertigo, headaches, SOB, fatigue, pain under both breasts for the past 2 1/2 months was seen by PCP, ENT and neuro. 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 Now presents for removal of thoracic spinal cord tumor. PCP recommended a cardiology work up for the fatigue SOB and intermittent chest pain which was done on 2/12/18, awaiting results.     PROCEDURE: 2/21 s/p T6-T8 laminectomy and removal of meningioma     POD#3    PLAN:  Neuro:   - MRI T spine- post op changes, no cord compression  - pain control- continue percocet prn, valium prn spasm  Respiratory:   - encouraged incentive spirometry  CV:  -BP -stable  DVT ppx:   - venodynes, chemoprophylaxis  GI:    - constipation, abd xray- no bowel obstruction  - enema last night - no BM  -continue colace, senna, miralax, mag sulfate x1  PT/OT:   - outpatient PT, discharge after BM, abd discomfort resolves  Discussed with Dr Roman Isaacs # [id number]

## 2018-02-25 ENCOUNTER — TRANSCRIPTION ENCOUNTER (OUTPATIENT)
Age: 53
End: 2018-02-25

## 2018-02-25 VITALS
HEART RATE: 74 BPM | TEMPERATURE: 98 F | OXYGEN SATURATION: 100 % | DIASTOLIC BLOOD PRESSURE: 64 MMHG | RESPIRATION RATE: 18 BRPM | SYSTOLIC BLOOD PRESSURE: 101 MMHG

## 2018-02-25 PROCEDURE — 97116 GAIT TRAINING THERAPY: CPT

## 2018-02-25 PROCEDURE — 72157 MRI CHEST SPINE W/O & W/DYE: CPT

## 2018-02-25 PROCEDURE — 85027 COMPLETE CBC AUTOMATED: CPT

## 2018-02-25 PROCEDURE — 72020 X-RAY EXAM OF SPINE 1 VIEW: CPT

## 2018-02-25 PROCEDURE — C1889: CPT

## 2018-02-25 PROCEDURE — 86901 BLOOD TYPING SEROLOGIC RH(D): CPT

## 2018-02-25 PROCEDURE — 74018 RADEX ABDOMEN 1 VIEW: CPT

## 2018-02-25 PROCEDURE — 86900 BLOOD TYPING SEROLOGIC ABO: CPT

## 2018-02-25 PROCEDURE — 81025 URINE PREGNANCY TEST: CPT

## 2018-02-25 PROCEDURE — 88307 TISSUE EXAM BY PATHOLOGIST: CPT

## 2018-02-25 PROCEDURE — 88360 TUMOR IMMUNOHISTOCHEM/MANUAL: CPT

## 2018-02-25 PROCEDURE — 74177 CT ABD & PELVIS W/CONTRAST: CPT

## 2018-02-25 PROCEDURE — 80048 BASIC METABOLIC PNL TOTAL CA: CPT

## 2018-02-25 PROCEDURE — 77002 NEEDLE LOCALIZATION BY XRAY: CPT

## 2018-02-25 PROCEDURE — C1769: CPT

## 2018-02-25 PROCEDURE — 97530 THERAPEUTIC ACTIVITIES: CPT

## 2018-02-25 PROCEDURE — 97161 PT EVAL LOW COMPLEX 20 MIN: CPT

## 2018-02-25 PROCEDURE — A9585: CPT

## 2018-02-25 RX ORDER — SOD SULF/SODIUM/NAHCO3/KCL/PEG
1000 SOLUTION, RECONSTITUTED, ORAL ORAL
Qty: 0 | Refills: 0 | Status: DISCONTINUED | OUTPATIENT
Start: 2018-02-25 | End: 2018-02-25

## 2018-02-25 RX ORDER — ACETAMINOPHEN 500 MG
2 TABLET ORAL
Qty: 0 | Refills: 0 | COMMUNITY
Start: 2018-02-25

## 2018-02-25 RX ADMIN — Medication 650 MILLIGRAM(S): at 00:45

## 2018-02-25 RX ADMIN — Medication 650 MILLIGRAM(S): at 06:08

## 2018-02-25 RX ADMIN — Medication 1 TABLET(S): at 12:37

## 2018-02-25 RX ADMIN — Medication 100 MILLIGRAM(S): at 06:10

## 2018-02-25 RX ADMIN — Medication 650 MILLIGRAM(S): at 01:15

## 2018-02-25 RX ADMIN — Medication 500 MILLIGRAM(S): at 06:10

## 2018-02-25 RX ADMIN — Medication 4 MILLIGRAM(S): at 12:38

## 2018-02-25 RX ADMIN — Medication 4 MILLIGRAM(S): at 06:10

## 2018-02-25 RX ADMIN — ENOXAPARIN SODIUM 40 MILLIGRAM(S): 100 INJECTION SUBCUTANEOUS at 12:37

## 2018-02-25 RX ADMIN — Medication 4 MILLIGRAM(S): at 00:45

## 2018-02-25 RX ADMIN — Medication 650 MILLIGRAM(S): at 06:40

## 2018-02-25 RX ADMIN — Medication 100 MILLIGRAM(S): at 12:37

## 2018-02-25 RX ADMIN — Medication 1000 MILLILITER(S): at 12:37

## 2018-02-25 NOTE — PROGRESS NOTE ADULT - ASSESSMENT
52 year old female with c/o vertigo, headaches, SOB, fatigue, pain under both breasts for the past 2 1/2 months was seen by PCP, ENT and neuro. 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 Now presents for removal of thoracic spinal cord tumor. PCP recommended a cardiology work up for the fatigue SOB and intermittent chest pain which was done on 2/12/18, awaiting results.     PROCEDURE: 2/21 s/p T6-T8 laminectomy and removal of meningioma     POD#4    PLAN:  Neuro:   - abd pain- ?referred pain- some improvement with decadron  - MRI T spine- post op changes, no cord compression  - pain control- continue percocet prn (pt does not want to take any), valium prn spasm  Respiratory:   - encouraged incentive spirometry  CV:  -BP -stable  DVT ppx:   - venodynes, chemoprophylaxis  GI:    -no BM x 5 days  - constipation, abd xray- no bowel obstruction  -GI consult appreciated  -CT abd reveals moderate amount of stool  -moviprep for constipation per GI  -continue colace, senna, miralax,   PT/OT:   - outpatient PT, discharge after BM and abd discomfort resolves  Discussed with Dr Roman Hernandezink # [id number]

## 2018-02-25 NOTE — DISCHARGE NOTE ADULT - MEDICATION SUMMARY - MEDICATIONS TO TAKE
I will START or STAY ON the medications listed below when I get home from the hospital:    acetaminophen 325 mg oral tablet  -- 2 tab(s) by mouth every 6 hours, As needed, Mild Pain (1 - 3)  -- Indication: For mild to moderate pain    Xiidra 5% ophthalmic solution  -- 1 drop(s) to each affected eye 2 times a day  -- Indication: For eye drops    Probiotic Formula oral capsule  -- 1 cap(s) by mouth once a day  -- Indication: For supplement

## 2018-02-25 NOTE — CONSULT NOTE ADULT - SUBJECTIVE AND OBJECTIVE BOX
Patient is a 52y old  Female who presented for surgical removal of a spinal cord tumor (2018 08:35)    HPI:  52 year old female with c/o vertigo, headaches, SOB, fatigue, pain under both breasts for the past 2 1/2 months was seen by PCP, ENT and neuro. 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   Presented for removal of thoracic spinal cord tumor.  She is now POD#4 and has not had a bowel movement for 5 days.  CT abdomen last night revealed large amount of feces throughout the colon - my read    PAST MEDICAL & SURGICAL HISTORY:  Basal cell carcinoma (BCC) in situ of skin: excised from ear in dermatologist office 2018  Chest pain: after activity, started in , had cardiology workup 18, awaiting results  Diverticulitis: hospitalized in , no further episodes  Dry eye  H/O: :     Allergies  No Known Allergies    MEDICATIONS  (STANDING):  ascorbic acid 500 milliGRAM(s) Oral two times a day  dexamethasone  Injectable 4 milliGRAM(s) IV Push every 6 hours  docusate sodium 100 milliGRAM(s) Oral three times a day  enoxaparin Injectable 40 milliGRAM(s) SubCutaneous every 24 hours  multivitamin 1 Tablet(s) Oral daily  polyethylene glycol 3350 17 Gram(s) Oral daily  polyethylene glycol/electrolyte Solution 1000 milliLiter(s) Oral two times a day  senna 2 Tablet(s) Oral at bedtime  sodium chloride 0.9%. 1000 milliLiter(s) (50 mL/Hr) IV Continuous <Continuous>    MEDICATIONS  (PRN):  acetaminophen   Tablet 650 milliGRAM(s) Oral every 6 hours PRN For Temp greater than 38 C (100.4 F)  acetaminophen   Tablet. 650 milliGRAM(s) Oral every 6 hours PRN Mild Pain (1 - 3)  aluminum hydroxide/magnesium hydroxide/simethicone Suspension 30 milliLiter(s) Oral every 4 hours PRN Dyspepsia  diazepam    Tablet 5 milliGRAM(s) Oral three times a day PRN muscle  famotidine    Tablet 20 milliGRAM(s) Oral every 12 hours PRN Dyspepsia  magnesium hydroxide Suspension 30 milliLiter(s) Oral every 12 hours PRN Constipation  ondansetron Injectable 4 milliGRAM(s) IV Push every 6 hours PRN Nausea  oxyCODONE    5 mG/acetaminophen 325 mG 1 Tablet(s) Oral every 4 hours PRN Moderate Pain  oxyCODONE    5 mG/acetaminophen 325 mG 2 Tablet(s) Oral every 6 hours PRN Severe Pain    Review of Systems:  General:  No wt loss, fevers, chills, night sweats,fatigue,   CV:  No pain, palpitatioins, hypo/hypertension  Resp:  No dyspnea, cough, tachypnea, wheezing  :  No pain, bleeding, incontinence, nocturia  Muscle:  No pain, weakness  Neuro:  No weakness, tingling, memory problems  Psych:  No fatigue, insomnia, mood problems, depression  Endocrine:  No polyuria, polydypsia, cold/heat intolerance  Heme:  No petechiae, ecchymosis, easy bruisability  Skin:  No rash, tattoos, scars, edema    Vital Signs Last 24 Hrs  T(C): 36.7 (2018 04:50), Max: 36.9 (2018 13:47)  T(F): 98 (2018 04:50), Max: 98.5 (2018 23:54)  HR: 76 (2018 04:50) (67 - 76)  BP: 109/66 (2018 04:50) (100/69 - 118/75)  BP(mean): --  RR: 18 (2018 04:50) (18 - 18)  SpO2: 97% (2018 04:50) (96% - 100%)    PHYSICAL EXAM:  Constitutional: NAD, well-developed  Neck: No LAD, supple  Respiratory: CTA and P  Cardiovascular: S1 and S2, RRR, no M  Gastrointestinal: BS+, soft, NT/ND, neg HSM,  Extremities: No peripheral edema, neg clubing, cyanosis  Vascular: 2+ peripheral pulses  Neurological: A/O x 3, no focal deficits  Psychiatric: Normal mood, normal affect  Skin: No rashes    LABS:  Complete Blood Count in AM (18 @ 06:17)    WBC Count: 15.6 K/uL    RBC Count: 3.20 M/uL    Hemoglobin: 9.6 g/dL    Hematocrit: 29.2 %    Mean Cell Volume: 91.2 fl    Mean Cell Hemoglobin: 29.9 pg    Mean Cell Hemoglobin Conc: 32.8 gm/dL    Red Cell Distrib Width: 12.2 %    Platelet Count - Automated: 243 K/uL    Basic Metabolic Panel in AM (18 @ 06:17)    Sodium, Serum: 141 mmol/L    Potassium, Serum: 4.1 mmol/L    Chloride, Serum: 106 mmol/L    Carbon Dioxide, Serum: 24 mmol/L    Anion Gap, Serum: 11 mmol/L    Blood Urea Nitrogen, Serum: 9 mg/dL    Creatinine, Serum: 0.61 mg/dL    Glucose, Serum: 122 mg/dL    Calcium, Total Serum: 8.3 mg/dL    RADIOLOGY & ADDITIONAL TESTS:

## 2018-02-25 NOTE — DISCHARGE NOTE ADULT - PATIENT PORTAL LINK FT
You can access the [facility], offered by [facility], by registering with the following website: [email or web address]

## 2018-02-25 NOTE — DISCHARGE NOTE ADULT - CARE PLAN
Principal Discharge DX:	Spinal cord tumor  Goal:	2/21/18 s/p T6-T8 laminectomy, removal of tumor  Assessment and plan of treatment:	You may shower. Pat incision dry after shower. Keep incision clean and dry. Make appointment for follow up with Dr Owens in 2 weeks  Secondary Diagnosis:	S/P laminectomy  Goal:	stable  Secondary Diagnosis:	Constipation, acute  Goal:	resolved  Assessment and plan of treatment:	Make appt for follow up with your Primary care Physician in 1 week.

## 2018-02-25 NOTE — CONSULT NOTE ADULT - ASSESSMENT
52WF s/p T6-7 tumor extraction now with abdominal pain and CT revealing large amount of feces throughout the colon.  She has not had a bowel movement in 5 days and normally has daily bowel movements.    Rec-  -Bowel purge today with Moviprep x 2 liters.  -Will follow up thereafter.    Thank you for the courtesy of this consultation.

## 2018-02-25 NOTE — DISCHARGE NOTE ADULT - NS AS ACTIVITY OBS
No Heavy lifting/straining/You may shower and wash your hair/Walking-Indoors allowed/Stairs allowed/Do not make important decisions/Showering allowed/Do not drive or operate machinery/Walking-Outdoors allowed

## 2018-02-25 NOTE — DISCHARGE NOTE ADULT - PLAN OF CARE
2/21/18 s/p T6-T8 laminectomy, removal of tumor You may shower. Pat incision dry after shower. Keep incision clean and dry. Make appointment for follow up with Dr Owens in 2 weeks stable resolved Make appt for follow up with your Primary care Physician in 1 week.

## 2018-02-25 NOTE — DISCHARGE NOTE ADULT - HOSPITAL COURSE
52 year old female with c/o vertigo, headaches, SOB, fatigue, pain under both breasts for the past 2 1/2 months was seen by PCP, ENT and neuro. 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 Now presents for removal of thoracic spinal cord tumor. PCP recommended a cardiology work up for the fatigue SOB and intermittent chest pain which was done on 2/12/18, awaiting results.     PROCEDURE: 2/21 s/p T6-T8 laminectomy and removal of meningioma. Post operatively pt experienced severe abdominal pain and constipation. GI consult called, pt given moviprep and constipation resolved. Pt was evaluated by Physical Therapy and outpatient PT was recommended. Pt is medically and neurologically stable for discharge to home.

## 2018-02-25 NOTE — DISCHARGE NOTE ADULT - ADDITIONAL INSTRUCTIONS
Any sign of fever, chills, severe back pain, lethargy, or nausea or vomiting contact Dr Owens or go to ER.

## 2018-02-25 NOTE — DISCHARGE NOTE ADULT - CARE PROVIDER_API CALL
Miko Owens (DO), Neurosurgery  Spine  [address]  [address]  [place] [address]  Phone: [phone]  Fax: [phone]    Primary Care Physician,   Phone: (   )    -  Fax: (   )    -

## 2018-02-26 ENCOUNTER — OTHER (OUTPATIENT)
Age: 53
End: 2018-02-26

## 2018-02-28 ENCOUNTER — APPOINTMENT (OUTPATIENT)
Dept: SPINE | Facility: CLINIC | Age: 53
End: 2018-02-28
Payer: COMMERCIAL

## 2018-02-28 VITALS
HEART RATE: 79 BPM | WEIGHT: 118 LBS | HEIGHT: 60 IN | SYSTOLIC BLOOD PRESSURE: 97 MMHG | BODY MASS INDEX: 23.16 KG/M2 | DIASTOLIC BLOOD PRESSURE: 58 MMHG | TEMPERATURE: 97.1 F

## 2018-02-28 PROCEDURE — 99024 POSTOP FOLLOW-UP VISIT: CPT

## 2018-02-28 RX ORDER — SODIUM CHLORIDE FOR INHALATION 0.9 %
0.9 VIAL, NEBULIZER (ML) INHALATION
Qty: 300 | Refills: 0 | Status: COMPLETED | COMMUNITY
Start: 2017-11-30

## 2018-02-28 RX ORDER — METHYLPREDNISOLONE 4 MG/1
4 TABLET ORAL
Qty: 21 | Refills: 0 | Status: COMPLETED | COMMUNITY
Start: 2017-12-28

## 2018-02-28 RX ORDER — MECLIZINE HYDROCHLORIDE 25 MG/1
25 TABLET ORAL
Qty: 30 | Refills: 0 | Status: COMPLETED | COMMUNITY
Start: 2017-11-30

## 2018-02-28 RX ORDER — CEPHALEXIN 500 MG/1
500 CAPSULE ORAL
Qty: 14 | Refills: 0 | Status: COMPLETED | COMMUNITY
Start: 2018-01-30

## 2018-02-28 RX ORDER — CIPROFLOXACIN HYDROCHLORIDE 500 MG/1
500 TABLET, FILM COATED ORAL
Qty: 14 | Refills: 0 | Status: COMPLETED | COMMUNITY
Start: 2017-12-28

## 2018-02-28 RX ORDER — LIFITEGRAST 50 MG/ML
5 SOLUTION/ DROPS OPHTHALMIC
Qty: 60 | Refills: 0 | Status: COMPLETED | COMMUNITY
Start: 2017-11-21

## 2018-02-28 RX ORDER — METRONIDAZOLE 500 MG/1
500 TABLET ORAL
Qty: 21 | Refills: 0 | Status: COMPLETED | COMMUNITY
Start: 2017-12-28

## 2018-03-02 ENCOUNTER — CLINICAL ADVICE (OUTPATIENT)
Age: 53
End: 2018-03-02

## 2018-03-05 ENCOUNTER — APPOINTMENT (OUTPATIENT)
Dept: SPINE | Facility: CLINIC | Age: 53
End: 2018-03-05
Payer: COMMERCIAL

## 2018-03-05 VITALS
SYSTOLIC BLOOD PRESSURE: 100 MMHG | HEIGHT: 60 IN | BODY MASS INDEX: 23.16 KG/M2 | WEIGHT: 118 LBS | DIASTOLIC BLOOD PRESSURE: 60 MMHG

## 2018-03-05 VITALS — TEMPERATURE: 97.5 F

## 2018-03-05 PROCEDURE — 99024 POSTOP FOLLOW-UP VISIT: CPT

## 2018-03-05 RX ORDER — CEPHALEXIN 500 MG/1
500 TABLET ORAL
Qty: 14 | Refills: 0 | Status: COMPLETED | COMMUNITY
Start: 2018-01-30 | End: 2018-03-05

## 2018-03-05 RX ORDER — TIZANIDINE HYDROCHLORIDE 2 MG/1
2 CAPSULE ORAL
Qty: 30 | Refills: 0 | Status: DISCONTINUED | COMMUNITY
Start: 2018-03-02 | End: 2018-03-05

## 2018-04-09 ENCOUNTER — APPOINTMENT (OUTPATIENT)
Dept: SPINE | Facility: CLINIC | Age: 53
End: 2018-04-09
Payer: COMMERCIAL

## 2018-04-09 VITALS
SYSTOLIC BLOOD PRESSURE: 104 MMHG | BODY MASS INDEX: 23.16 KG/M2 | WEIGHT: 118 LBS | HEIGHT: 60 IN | DIASTOLIC BLOOD PRESSURE: 60 MMHG

## 2018-04-09 PROCEDURE — 99024 POSTOP FOLLOW-UP VISIT: CPT

## 2018-04-09 RX ORDER — DIAZEPAM 2 MG/1
2 TABLET ORAL DAILY
Qty: 30 | Refills: 0 | Status: DISCONTINUED | COMMUNITY
Start: 2018-03-05 | End: 2018-04-09

## 2018-05-21 ENCOUNTER — APPOINTMENT (OUTPATIENT)
Dept: SPINE | Facility: CLINIC | Age: 53
End: 2018-05-21
Payer: COMMERCIAL

## 2018-05-21 VITALS — RESPIRATION RATE: 14 BRPM | HEART RATE: 68 BPM | BODY MASS INDEX: 23.16 KG/M2 | HEIGHT: 60 IN | WEIGHT: 118 LBS

## 2018-05-21 PROCEDURE — 99024 POSTOP FOLLOW-UP VISIT: CPT

## 2018-05-21 RX ORDER — NICOTINE POLACRILEX 2 MG
GUM BUCCAL
Refills: 0 | Status: DISCONTINUED | COMMUNITY
End: 2018-05-21

## 2018-09-10 ENCOUNTER — APPOINTMENT (OUTPATIENT)
Dept: SPINE | Facility: CLINIC | Age: 53
End: 2018-09-10
Payer: COMMERCIAL

## 2018-09-10 VITALS
SYSTOLIC BLOOD PRESSURE: 90 MMHG | BODY MASS INDEX: 24.15 KG/M2 | HEART RATE: 64 BPM | HEIGHT: 60 IN | WEIGHT: 123 LBS | DIASTOLIC BLOOD PRESSURE: 56 MMHG | RESPIRATION RATE: 12 BRPM

## 2018-09-10 PROCEDURE — 99214 OFFICE O/P EST MOD 30 MIN: CPT

## 2018-09-11 ENCOUNTER — OTHER (OUTPATIENT)
Age: 53
End: 2018-09-11

## 2019-01-22 PROBLEM — R07.9 CHEST PAIN, UNSPECIFIED: Chronic | Status: ACTIVE | Noted: 2018-02-14

## 2019-01-22 PROBLEM — D04.9 CARCINOMA IN SITU OF SKIN, UNSPECIFIED: Chronic | Status: ACTIVE | Noted: 2018-02-14

## 2019-01-22 PROBLEM — H04.129 DRY EYE SYNDROME OF UNSPECIFIED LACRIMAL GLAND: Chronic | Status: ACTIVE | Noted: 2018-02-14

## 2019-01-22 PROBLEM — K57.92 DIVERTICULITIS OF INTESTINE, PART UNSPECIFIED, WITHOUT PERFORATION OR ABSCESS WITHOUT BLEEDING: Chronic | Status: ACTIVE | Noted: 2018-02-14

## 2019-02-25 ENCOUNTER — APPOINTMENT (OUTPATIENT)
Dept: SPINE | Facility: CLINIC | Age: 54
End: 2019-02-25
Payer: COMMERCIAL

## 2019-02-25 VITALS
HEIGHT: 60 IN | BODY MASS INDEX: 23.56 KG/M2 | DIASTOLIC BLOOD PRESSURE: 60 MMHG | SYSTOLIC BLOOD PRESSURE: 90 MMHG | WEIGHT: 120 LBS

## 2019-02-25 PROCEDURE — 99214 OFFICE O/P EST MOD 30 MIN: CPT

## 2019-02-25 RX ORDER — ACETAMINOPHEN 500 MG/1
TABLET, COATED ORAL
Refills: 0 | Status: DISCONTINUED | COMMUNITY
End: 2019-02-25

## 2019-02-25 NOTE — REVIEW OF SYSTEMS
[Negative] : Heme/Lymph [As Noted in HPI] : as noted in HPI [Numbness] : numbness [Tingling] : tingling [de-identified] : tingling of thoracic cavity

## 2019-02-25 NOTE — HISTORY OF PRESENT ILLNESS
[FreeTextEntry1] : I had the pleasure of seeing Ms. Christi Partida for a post-operative visit to my office today. Ms. 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 The patient notes improvement in her right greater than left paresthesias below her breasts, her vertigo, and her muscular pain around her incision site since her last visit. Her incision has healed well.

## 2019-02-25 NOTE — PHYSICAL EXAM
[General Appearance - Alert] : alert [General Appearance - In No Acute Distress] : in no acute distress [Longitudinal] : longitudinal [Clean] : clean [Dry] : dry [Well-Healed] : well-healed [No Drainage] : without drainage [Normal Skin] : normal [Normal Skin Turgor] : skin turgor was normal [Oriented To Time, Place, And Person] : oriented to person, place, and time [Impaired Insight] : insight and judgment were intact [Affect] : the affect was normal [Person] : oriented to person [Place] : oriented to place [Time] : oriented to time [Short Term Intact] : short term memory intact [Remote Intact] : remote memory intact [Span Intact] : the attention span was normal [Concentration Intact] : normal concentrating ability [Fluency] : fluency intact [Comprehension] : comprehension intact [Current Events] : adequate knowledge of current events [Past History] : adequate knowledge of personal past history [Vocabulary] : adequate range of vocabulary [Cranial Nerves Optic (II)] : visual acuity intact bilaterally,  pupils equal round and reactive to light [Cranial Nerves Oculomotor (III)] : extraocular motion intact [Cranial Nerves Trigeminal (V)] : facial sensation intact symmetrically [Cranial Nerves Facial (VII)] : face symmetrical [Cranial Nerves Vestibulocochlear (VIII)] : hearing was intact bilaterally [Cranial Nerves Glossopharyngeal (IX)] : tongue and palate midline [Cranial Nerves Accessory (XI - Cranial And Spinal)] : head turning and shoulder shrug symmetric [Cranial Nerves Hypoglossal (XII)] : there was no tongue deviation with protrusion [Motor Strength] : muscle strength was normal in all four extremities [No Muscle Atrophy] : normal bulk in all four extremities [Sensation Tactile Decrease] : light touch was intact [Balance] : balance was intact [2+] : Patella left 2+ [No Scoliosis] : no scoliosis [No Visual Abnormalities] : no visible abnormailities [No Tenderness to Palpation] : no spine tenderness on palpation [Full ROM] : full ROM [No Pain with ROM] : no pain with motion in any direction [Normal] : normal [Intact] : all reflexes within normal limits bilaterally [Sclera] : the sclera and conjunctiva were normal [PERRL With Normal Accommodation] : pupils were equal in size, round, reactive to light, with normal accommodation [Extraocular Movements] : extraocular movements were intact [Outer Ear] : the ears and nose were normal in appearance [Hearing Threshold Finger Rub Not [place]] : hearing was normal [Oropharynx] : the oropharynx was normal [Neck Appearance] : the appearance of the neck was normal [Neck Cervical Mass (___cm)] : no neck mass was observed [Jugular Venous Distention Increased] : there was no jugular-venous distention [Thyroid Diffuse Enlargement] : the thyroid was not enlarged [Thyroid Nodule] : there were no palpable thyroid nodules [Auscultation Breath Sounds / Voice Sounds] : lungs were clear to auscultation bilaterally [Heart Rate And Rhythm] : heart rate was normal and rhythm regular [Heart Sounds] : normal S1 and S2 [Heart Sounds Gallop] : no gallops [Murmurs] : no murmurs [Heart Sounds Pericardial Friction Rub] : no pericardial rub [Full Pulse] : the pedal pulses are present [Edema] : there was no peripheral edema [Bowel Sounds] : normal bowel sounds [Abdomen Soft] : soft [Abdomen Tenderness] : non-tender [Abdomen Mass (___ Cm)] : no abdominal mass palpated [No CVA Tenderness] : no ~M costovertebral angle tenderness [No Spinal Tenderness] : no spinal tenderness [Abnormal Walk] : normal gait [Nail Clubbing] : no clubbing  or cyanosis of the fingernails [Musculoskeletal - Swelling] : no joint swelling seen [Motor Tone] : muscle strength and tone were normal [Skin Color & Pigmentation] : normal skin color and pigmentation [Skin Turgor] : normal skin turgor [] : no rash [Past-pointing] : there was no past-pointing [Tremor] : no tremor present [L'Hermitte's] : neck flexion did not produce tingling down the spine/arms [Spurling's - Opposite Side] : Negative Spurling's on opposite side [Spurling's Same Side] : Negative Spurling's on same side [Straight-Leg Raise Test - Left] : straight leg raise of the left leg was negative [Straight-Leg Raise Test - Right] : straight leg raise  of the right leg was negative

## 2019-02-25 NOTE — REASON FOR VISIT
[Follow-Up: _____] : a [unfilled] follow-up visit [Other: _____] : [unfilled] [FreeTextEntry1] : Thoracic meningioma and one year post  T 4 to T 7 fusion for intradural WHO grade 1 Psammomatous Meningioma

## 2019-02-25 NOTE — ASSESSMENT
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

## 2019-12-02 ENCOUNTER — TRANSCRIPTION ENCOUNTER (OUTPATIENT)
Age: 54
End: 2019-12-02

## 2019-12-09 ENCOUNTER — APPOINTMENT (OUTPATIENT)
Dept: SPINE | Facility: CLINIC | Age: 54
End: 2019-12-09
Payer: COMMERCIAL

## 2019-12-09 VITALS — SYSTOLIC BLOOD PRESSURE: 100 MMHG | HEART RATE: 50 BPM | DIASTOLIC BLOOD PRESSURE: 60 MMHG

## 2019-12-09 VITALS
HEART RATE: 48 BPM | BODY MASS INDEX: 22.78 KG/M2 | OXYGEN SATURATION: 97 % | RESPIRATION RATE: 16 BRPM | WEIGHT: 116 LBS | SYSTOLIC BLOOD PRESSURE: 105 MMHG | HEIGHT: 60 IN | DIASTOLIC BLOOD PRESSURE: 54 MMHG

## 2019-12-09 DIAGNOSIS — Z98.890 OTHER SPECIFIED POSTPROCEDURAL STATES: ICD-10-CM

## 2019-12-09 PROCEDURE — 99214 OFFICE O/P EST MOD 30 MIN: CPT

## 2019-12-09 RX ORDER — LIFITEGRAST 50 MG/ML
5 SOLUTION/ DROPS OPHTHALMIC
Refills: 0 | Status: ACTIVE | COMMUNITY

## 2019-12-09 NOTE — DATA REVIEWED
[de-identified] : Thoracic spine done at [facility] radiology on 12/04/2019: no obvious significant recurrence

## 2019-12-09 NOTE — PHYSICAL EXAM
[General Appearance - Alert] : alert [General Appearance - In No Acute Distress] : in no acute distress [Longitudinal] : longitudinal [Clean] : clean [Well-Healed] : well-healed [No Drainage] : without drainage [Normal Skin] : normal [Normal Skin Turgor] : skin turgor was normal [Oriented To Time, Place, And Person] : oriented to person, place, and time [Impaired Insight] : insight and judgment were intact [Affect] : the affect was normal [Person] : oriented to person [Place] : oriented to place [Time] : oriented to time [Short Term Intact] : short term memory intact [Remote Intact] : remote memory intact [Span Intact] : the attention span was normal [Concentration Intact] : normal concentrating ability [Fluency] : fluency intact [Comprehension] : comprehension intact [Current Events] : adequate knowledge of current events [Past History] : adequate knowledge of personal past history [Vocabulary] : adequate range of vocabulary [Cranial Nerves Optic (II)] : visual acuity intact bilaterally,  pupils equal round and reactive to light [Cranial Nerves Oculomotor (III)] : extraocular motion intact [Cranial Nerves Trigeminal (V)] : facial sensation intact symmetrically [Cranial Nerves Facial (VII)] : face symmetrical [Cranial Nerves Vestibulocochlear (VIII)] : hearing was intact bilaterally [Cranial Nerves Glossopharyngeal (IX)] : tongue and palate midline [Cranial Nerves Accessory (XI - Cranial And Spinal)] : head turning and shoulder shrug symmetric [Cranial Nerves Hypoglossal (XII)] : there was no tongue deviation with protrusion [Motor Strength] : muscle strength was normal in all four extremities [No Muscle Atrophy] : normal bulk in all four extremities [Sensation Tactile Decrease] : light touch was intact [Balance] : balance was intact [Past-pointing] : there was no past-pointing [Tremor] : no tremor present [2+] : Patella right 2+ [L'Hermitte's] : neck flexion did not produce tingling down the spine/arms [Spurling's - Opposite Side] : Negative Spurling's on opposite side [Spurling's Same Side] : Negative Spurling's on same side [No Scoliosis] : no scoliosis [No Visual Abnormalities] : no visible abnormailities [No Tenderness to Palpation] : no spine tenderness on palpation [Full ROM] : full ROM [No Pain with ROM] : no pain with motion in any direction [Straight-Leg Raise Test - Left] : straight leg raise of the left leg was negative [Straight-Leg Raise Test - Right] : straight leg raise  of the right leg was negative [Normal] : normal [Intact] : all reflexes within normal limits bilaterally [Sclera] : the sclera and conjunctiva were normal [PERRL With Normal Accommodation] : pupils were equal in size, round, reactive to light, with normal accommodation [Extraocular Movements] : extraocular movements were intact [Outer Ear] : the ears and nose were normal in appearance [Hearing Threshold Finger Rub Not [place]] : hearing was normal [Oropharynx] : the oropharynx was normal [Neck Appearance] : the appearance of the neck was normal [Neck Cervical Mass (___cm)] : no neck mass was observed [Jugular Venous Distention Increased] : there was no jugular-venous distention [Thyroid Diffuse Enlargement] : the thyroid was not enlarged [Thyroid Nodule] : there were no palpable thyroid nodules [Auscultation Breath Sounds / Voice Sounds] : lungs were clear to auscultation bilaterally [Heart Rate And Rhythm] : heart rate was normal and rhythm regular [Heart Sounds] : normal S1 and S2 [Heart Sounds Gallop] : no gallops [Murmurs] : no murmurs [Heart Sounds Pericardial Friction Rub] : no pericardial rub [Full Pulse] : the pedal pulses are present [Edema] : there was no peripheral edema [Bowel Sounds] : normal bowel sounds [Abdomen Soft] : soft [Abdomen Tenderness] : non-tender [Abdomen Mass (___ Cm)] : no abdominal mass palpated [No CVA Tenderness] : no ~M costovertebral angle tenderness [No Spinal Tenderness] : no spinal tenderness [Abnormal Walk] : normal gait [Nail Clubbing] : no clubbing  or cyanosis of the fingernails [Musculoskeletal - Swelling] : no joint swelling seen [Motor Tone] : muscle strength and tone were normal [Skin Color & Pigmentation] : normal skin color and pigmentation [Skin Turgor] : normal skin turgor [] : no rash

## 2019-12-09 NOTE — ASSESSMENT
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 If any small recurrence is noted, the patient should see Radiation Oncology Dr. Vinnie Montes. If any large recurrence is noted, the patient should see my colleague Dr. Aretha Harris.\par

## 2019-12-09 NOTE — HISTORY OF PRESENT ILLNESS
[FreeTextEntry1] : I had the pleasure of seeing Ms. Christi Partida for a post-operative visit to my office today. Ms. 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 The patient notes improvement in her right greater than left paresthesias below her breasts, her vertigo, and her muscular pain around her incision site. Her incision has healed well.

## 2020-01-31 NOTE — H&P PST ADULT - ATTENDING PHYSICIAN (PRINT):______________________________ DATE:_______ TIME:_______
Continue Regimen: Gebtle care, freq moist. Initiate Treatment: Lower dose hydroxyzine, cerave anti itch/sarna Detail Level: Zone Statement Selected

## 2020-12-22 ENCOUNTER — APPOINTMENT (OUTPATIENT)
Dept: SPINE | Facility: CLINIC | Age: 55
End: 2020-12-22
Payer: COMMERCIAL

## 2020-12-22 VITALS
SYSTOLIC BLOOD PRESSURE: 90 MMHG | HEIGHT: 60 IN | RESPIRATION RATE: 16 BRPM | DIASTOLIC BLOOD PRESSURE: 54 MMHG | HEART RATE: 61 BPM | OXYGEN SATURATION: 95 % | TEMPERATURE: 98.1 F | WEIGHT: 125 LBS | BODY MASS INDEX: 24.54 KG/M2

## 2020-12-22 PROCEDURE — 99072 ADDL SUPL MATRL&STAF TM PHE: CPT

## 2020-12-22 PROCEDURE — 99213 OFFICE O/P EST LOW 20 MIN: CPT

## 2020-12-22 NOTE — PHYSICAL EXAM
[Person] : oriented to person [Place] : oriented to place [Time] : oriented to time [Short Term Intact] : short term memory intact [Remote Intact] : remote memory intact [Span Intact] : the attention span was normal [Concentration Intact] : normal concentrating ability [Fluency] : fluency intact [Comprehension] : comprehension intact [Current Events] : adequate knowledge of current events [Past History] : adequate knowledge of personal past history [Vocabulary] : adequate range of vocabulary [Cranial Nerves Optic (II)] : visual acuity intact bilaterally,  pupils equal round and reactive to light [Cranial Nerves Oculomotor (III)] : extraocular motion intact [Cranial Nerves Trigeminal (V)] : facial sensation intact symmetrically [Cranial Nerves Facial (VII)] : face symmetrical [Cranial Nerves Vestibulocochlear (VIII)] : hearing was intact bilaterally [Cranial Nerves Glossopharyngeal (IX)] : tongue and palate midline [Cranial Nerves Accessory (XI - Cranial And Spinal)] : head turning and shoulder shrug symmetric [Cranial Nerves Hypoglossal (XII)] : there was no tongue deviation with protrusion [Motor Tone] : muscle tone was normal in all four extremities [Motor Strength] : muscle strength was normal in all four extremities [No Muscle Atrophy] : normal bulk in all four extremities [5] : S1 toe walking 5/5 [Sensation Tactile Decrease] : light touch was intact [Abnormal Walk] : normal gait [Balance] : balance was intact [Past-pointing] : there was no past-pointing [Tremor] : no tremor present [3+] : Ankle jerk left 3+ [___] : absent on the right [___] : absent on the left [Frye] : Frye's sign was not demonstrated

## 2020-12-22 NOTE — DATA REVIEWED
[de-identified] : Thoracic with and without contrast done at [facility]iology on 12/19/2020 compared with 12/14/2019.

## 2020-12-22 NOTE — ASSESSMENT
[FreeTextEntry1] : The images and findings were reviewed and discussed with the patient.  It was recommended that the patient return in one year for review of a new MRI of the thoracic spine with and without contrast for surveillance.

## 2020-12-22 NOTE — CONSULT LETTER
[Dear  ___] : Dear  [unfilled], [Courtesy Letter:] : I had the pleasure of seeing your patient, [unfilled], in my office today. [Please see my note below.] : Please see my note below. [Consult Closing:] : Thank you very much for allowing me to participate in the care of this patient.  If you have any questions, please do not hesitate to contact me. [Sincerely,] : Sincerely, [FreeTextEntry2] : Mikhail Reyes M.D.\par [address] [place] [address]\par [place] [id number] [FreeTextEntry1] : Christi Partida\par 1965 [FreeTextEntry3] : DANNI Mccloud.\par Nurse Practitioner\par Neurosurgery and Spine Department\par [facility]\par

## 2020-12-22 NOTE — REASON FOR VISIT
[Follow-Up: _____] : a [unfilled] follow-up visit [FreeTextEntry1] : DADA NGUYEN is a 55 year old lady who underwent T4-T7 laminectomies for resection of an intradural extramedullary psammomatous meningioma WHO grade 1 on 2/21/2018 by Dr. Miko Owens D.O.   The patient stated that she is doing well and is without pain.  She has been working out tolerated well.\par

## 2021-01-25 ENCOUNTER — APPOINTMENT (OUTPATIENT)
Dept: DERMATOLOGY | Facility: CLINIC | Age: 56
End: 2021-01-25

## 2021-01-26 ENCOUNTER — APPOINTMENT (OUTPATIENT)
Dept: DERMATOLOGY | Facility: CLINIC | Age: 56
End: 2021-01-26
Payer: COMMERCIAL

## 2021-01-26 DIAGNOSIS — A63.0 ANOGENITAL (VENEREAL) WARTS: ICD-10-CM

## 2021-01-26 PROCEDURE — 99072 ADDL SUPL MATRL&STAF TM PHE: CPT

## 2021-01-26 PROCEDURE — 99213 OFFICE O/P EST LOW 20 MIN: CPT | Mod: 25,GC

## 2021-01-26 PROCEDURE — 17110 DESTRUCTION B9 LES UP TO 14: CPT

## 2021-03-02 ENCOUNTER — APPOINTMENT (OUTPATIENT)
Dept: DERMATOLOGY | Facility: CLINIC | Age: 56
End: 2021-03-02

## 2022-02-28 ENCOUNTER — TRANSCRIPTION ENCOUNTER (OUTPATIENT)
Age: 57
End: 2022-02-28

## 2022-03-22 NOTE — PROGRESS NOTE ADULT - I WAS PHYSICALLY PRESENT FOR THE KEY PORTIONS OF THE EVALUATION AND MANAGEMENT (E/M) SERVICE PROVIDED.  I AGREE WITH THE ABOVE HISTORY, PHYSICAL, AND PLAN WHICH I HAVE REVIEWED AND EDITED WHERE APPROPRIATE
Rx Refill Note  Requested Prescriptions     Pending Prescriptions Disp Refills    meloxicam (MOBIC) 7.5 MG tablet [Pharmacy Med Name: MELOXICAM 7.5 MG TABLET] 30 tablet 5     Sig: TAKE 1 TABLET BY MOUTH EVERY DAY      Last office visit with prescribing clinician: Visit date not found      Next office visit with prescribing clinician: Visit date not found   Last Filled:08.24.2021    Primary osteoarthritis of left knee          Elena Brown MA  03/22/22, 08:47 EDT    {TIP  Encounters:    {TIP  Please add Last Relevant Lab Date if appropriate:  {TIP  Is Refill Pharmacy correct?:    
Statement Selected

## 2023-03-22 ENCOUNTER — APPOINTMENT (OUTPATIENT)
Dept: DERMATOLOGY | Facility: CLINIC | Age: 58
End: 2023-03-22
Payer: COMMERCIAL

## 2023-03-22 DIAGNOSIS — Z12.83 ENCOUNTER FOR SCREENING FOR MALIGNANT NEOPLASM OF SKIN: ICD-10-CM

## 2023-03-22 PROCEDURE — 99213 OFFICE O/P EST LOW 20 MIN: CPT

## 2024-01-23 ENCOUNTER — APPOINTMENT (OUTPATIENT)
Dept: SPINE | Facility: CLINIC | Age: 59
End: 2024-01-23
Payer: COMMERCIAL

## 2024-01-23 ENCOUNTER — APPOINTMENT (OUTPATIENT)
Dept: DERMATOLOGY | Facility: CLINIC | Age: 59
End: 2024-01-23
Payer: COMMERCIAL

## 2024-01-23 ENCOUNTER — NON-APPOINTMENT (OUTPATIENT)
Age: 59
End: 2024-01-23

## 2024-01-23 VITALS
OXYGEN SATURATION: 93 % | BODY MASS INDEX: 24.54 KG/M2 | HEIGHT: 60 IN | SYSTOLIC BLOOD PRESSURE: 109 MMHG | DIASTOLIC BLOOD PRESSURE: 68 MMHG | WEIGHT: 125 LBS | HEART RATE: 67 BPM

## 2024-01-23 DIAGNOSIS — C44.219 BASAL CELL CARCINOMA OF SKIN OF LEFT EAR AND EXTERNAL AURICULAR CANAL: ICD-10-CM

## 2024-01-23 DIAGNOSIS — L82.1 OTHER SEBORRHEIC KERATOSIS: ICD-10-CM

## 2024-01-23 DIAGNOSIS — D22.9 MELANOCYTIC NEVI, UNSPECIFIED: ICD-10-CM

## 2024-01-23 DIAGNOSIS — L81.4 OTHER MELANIN HYPERPIGMENTATION: ICD-10-CM

## 2024-01-23 DIAGNOSIS — R20.2 PARESTHESIA OF SKIN: ICD-10-CM

## 2024-01-23 DIAGNOSIS — D49.2 NEOPLASM OF UNSPECIFIED BEHAVIOR OF BONE, SOFT TISSUE, AND SKIN: ICD-10-CM

## 2024-01-23 PROCEDURE — 99202 OFFICE O/P NEW SF 15 MIN: CPT

## 2024-01-23 PROCEDURE — 99214 OFFICE O/P EST MOD 30 MIN: CPT

## 2024-01-23 NOTE — PHYSICAL EXAM
Talked to MONY  She had an old RX bottle  Informed new Rx sent 8/10/18   Mother expressed understanding of same [FreeTextEntry3] : The patient was alert and oriented X 3, well nourished, and in no apparent distress. Oropharynx showed no ulcerations. There was no visible lymphadenopathy. Conjunctiva were non-injected. There was no clubbing or edema of extremities. The scalp, hair, face, eyebrows, lips, oropharynx , neck, chest, abdomen, back, buttocks, extremities X 4, hands, feet, nails were examined. There was no hyperhidrosis or bromhidrosis. The following lesions are noted:   - ill defined macules in photodistribution - scattered stuck on tan-brown  papules and plaques on face and trunk - scattered brown macules on trunk and extremities with no concerning features on dermoscopy - bright red papules on trunk  - WHSS left conchal bowl  - erythema right upper back adjacent to linear spinal surgical scar  + dermatographism

## 2024-01-23 NOTE — HISTORY OF PRESENT ILLNESS
[FreeTextEntry1] : RPV: moles [de-identified] : DADA NGUYEN is a 58-year-old female patient who presents for evaluation of the followin. Moles throughout body, none changing or symptomatic. Requesting FBSE.   2. Itching on the right upper back, had spinal surgery in 2018 for a tumor in the spine. Notes a sensation that at times it feels numb, like anesthetic that "hasn't worn off"   Hx BCC L solitario mauro s/p Mohs 2018  Family Hx: No family history of skin cancer

## 2024-01-23 NOTE — ASSESSMENT
[FreeTextEntry1] : # Multiple benign nevi # Seborrheic keratoses, including in the R inguinal fold  # Lentigines  Full body exam today. Benign findings as above.  - Patient educated on ABCDEs of melanoma screening  - Photoprotection reviewed including sun-protective behaviors, protective clothing, and the use of OTC broad-spectrum SPF 30+ sunscreens was advised.  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

## 2024-01-23 NOTE — REASON FOR VISIT
[New Patient Visit] : a new patient visit [FreeTextEntry1] : The patient is here to follow up on her history of a T4-T7 laminectomy to remove a spinal meningioma done in 2018.

## 2024-01-23 NOTE — ASSESSMENT
[FreeTextEntry1] : Christi Partida is a 58 year old woman who underwent a T4-T7 laminectomy for removal of a Meningioma WHO type 1 on -2/22/2018 which she recovered well from.  The patient has not had a new MRI in more than 3 years.  It was recommended that she have a new MRI of the thoracic spine with and without contrast for surveillance of any recurrence of the meningioma.  She is to return to the office for review of the images by Dr. Tapia.

## 2024-01-23 NOTE — PHYSICAL EXAM
[General Appearance - Alert] : alert [General Appearance - In No Acute Distress] : in no acute distress [General Appearance - Well Nourished] : well nourished [General Appearance - Well Developed] : well developed [General Appearance - Well-Appearing] : healthy appearing [] : normal voice and communication [Oriented To Time, Place, And Person] : oriented to person, place, and time [Impaired Insight] : insight and judgment were intact [Affect] : the affect was normal [Mood] : the mood was normal [Memory Recent] : recent memory was not impaired [Memory Remote] : remote memory was not impaired [Person] : oriented to person [Place] : oriented to place [Time] : oriented to time [Short Term Intact] : short term memory intact [Remote Intact] : remote memory intact [Span Intact] : the attention span was normal [Concentration Intact] : normal concentrating ability [Fluency] : fluency intact [Comprehension] : comprehension intact [Current Events] : adequate knowledge of current events [Vocabulary] : adequate range of vocabulary [Past History] : adequate knowledge of personal past history [Cranial Nerves Optic (II)] : visual acuity intact bilaterally,  pupils equal round and reactive to light [Cranial Nerves Oculomotor (III)] : extraocular motion intact [Cranial Nerves Trigeminal (V)] : facial sensation intact symmetrically [Cranial Nerves Facial (VII)] : face symmetrical [Cranial Nerves Vestibulocochlear (VIII)] : hearing was intact bilaterally [Cranial Nerves Glossopharyngeal (IX)] : tongue and palate midline [Cranial Nerves Accessory (XI - Cranial And Spinal)] : head turning and shoulder shrug symmetric [Cranial Nerves Hypoglossal (XII)] : there was no tongue deviation with protrusion [Motor Tone] : muscle tone was normal in all four extremities [Motor Strength] : muscle strength was normal in all four extremities [No Muscle Atrophy] : normal bulk in all four extremities [Sensation Tactile Decrease] : light touch was intact [Abnormal Walk] : normal gait [Balance] : balance was intact [Past-pointing] : there was no past-pointing [Tremor] : no tremor present [3+] : Patella left 3+ [2+] : Ankle jerk left 2+ [Plantar Reflex Right Only] : normal on the right [___] : [unfilled] ~Ubeats present on the right [Plantar Reflex Left Only] : normal on the left [Frye] : Frye's sign was not demonstrated [___] : absent on the left [No Visual Abnormalities] : no visible abnormailities

## 2024-01-23 NOTE — HISTORY OF PRESENT ILLNESS
[Other: ___] : [unfilled] [FreeTextEntry1] : The patient denied having any pain, tingling, numbness, or weakness of her extremities. 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 Miko Owens who is no longer in this practice..  The patient stated that she has been doing well and has been exercising.  She denies any pain, tingling, numbness or weakness of her body or extremities.  The patient also denies any bowel or bladder problems.  Her last MRI of the thoracic spine done with and without contrast for surveillance of recurrent meningioma was done over three years ago on 12/19/2020.

## 2024-05-17 NOTE — REASON FOR VISIT
[facility]   Progress Note and Procedure Note      Ben Thrasher  MEDICAL RECORD NUMBER:  [id number]  AGE: 19 y.o.   GENDER: male  : 2005  EPISODE DATE:  2024    Subjective:     Chief Complaint   Patient presents with    Wound Check     Left lower leg         HISTORY of PRESENT ILLNESS HPI     Ben Thrasher is a 19 y.o. male who presents today for wound/ulcer evaluation.   History of Wound Context: here with grandma to follow up on left lower leg wound. Had ER visit 2024 due to silvercel getting stuck down in wound. Will change to plain packing with vashe moist to moist daily.   Wound/Ulcer Pain Timing/Severity: none  Quality of pain: N/A  Severity:  0 / 10   Modifying Factors: None  Associated Signs/Symptoms: none    Ulcer Identification:  Ulcer Type: traumatic  Contributing Factors: obesity         PAST MEDICAL HISTORY    History reviewed. No pertinent past medical history.    PAST SURGICAL HISTORY    Past Surgical History:   Procedure Laterality Date    TONSILLECTOMY         FAMILY HISTORY    History reviewed. No pertinent family history.    SOCIAL HISTORY    Social History     Tobacco Use    Smoking status: Never    Smokeless tobacco: Never   Vaping Use    Vaping Use: Never used   Substance Use Topics    Alcohol use: Never    Drug use: Never       ALLERGIES    No Known Allergies    MEDICATIONS    Current Outpatient Medications on File Prior to Encounter   Medication Sig Dispense Refill    cephALEXin (KEFLEX) 500 MG capsule Take 1 capsule by mouth 4 times daily       No current facility-administered medications on file prior to encounter.       REVIEW OF SYSTEMS    Review of Systems   Constitutional:  Negative for chills, fatigue and fever.   HENT:  Negative for congestion and rhinorrhea.    Respiratory:  Negative for cough and shortness of breath.    Cardiovascular:  Positive for leg swelling. Negative for chest pain.   Gastrointestinal:  Negative  [Follow-Up: _____] : a [unfilled] follow-up visit [FreeTextEntry1] : Christi Partida is a 54 year old woman who underwent T4-T7 laminectomies for resection of an intradural extramedullary spinal tumor on 02/21/2018.  The pathology is psammomatous meningioma WHO grade I.  The patient has a history of right greater than left parasthesias below her breasts and vertigo which improved after surgery.  She does c/o being tired and achy and states that she is being evaluated for hemochromatosis.  She is here for review of a new MRI of the thoracic spine with and without contrast. vertigo

## 2024-05-20 ENCOUNTER — APPOINTMENT (OUTPATIENT)
Dept: SPINE | Facility: CLINIC | Age: 59
End: 2024-05-20

## 2024-11-13 ENCOUNTER — APPOINTMENT (OUTPATIENT)
Dept: OBGYN | Facility: CLINIC | Age: 59
End: 2024-11-13
Payer: COMMERCIAL

## 2024-11-13 VITALS
BODY MASS INDEX: 23.56 KG/M2 | SYSTOLIC BLOOD PRESSURE: 108 MMHG | WEIGHT: 120 LBS | HEIGHT: 60 IN | DIASTOLIC BLOOD PRESSURE: 68 MMHG

## 2024-11-13 DIAGNOSIS — Z00.00 ENCOUNTER FOR GENERAL ADULT MEDICAL EXAMINATION W/OUT ABNORMAL FINDINGS: ICD-10-CM

## 2024-11-13 PROCEDURE — 82270 OCCULT BLOOD FECES: CPT

## 2024-11-13 PROCEDURE — 99459 PELVIC EXAMINATION: CPT

## 2024-11-13 PROCEDURE — 87210 SMEAR WET MOUNT SALINE/INK: CPT | Mod: QW

## 2024-11-13 PROCEDURE — 99386 PREV VISIT NEW AGE 40-64: CPT | Mod: 25

## 2024-11-14 LAB — HPV HIGH+LOW RISK DNA PNL CVX: NOT DETECTED

## 2024-11-18 LAB — CYTOLOGY CVX/VAG DOC THIN PREP: ABNORMAL

## 2024-12-04 ENCOUNTER — APPOINTMENT (OUTPATIENT)
Dept: OBGYN | Facility: CLINIC | Age: 59
End: 2024-12-04

## 2025-04-15 ENCOUNTER — EMERGENCY (EMERGENCY)
Facility: HOSPITAL | Age: 60
LOS: 1 days | End: 2025-04-15
Attending: EMERGENCY MEDICINE
Payer: COMMERCIAL

## 2025-04-15 VITALS
TEMPERATURE: 98 F | SYSTOLIC BLOOD PRESSURE: 114 MMHG | RESPIRATION RATE: 20 BRPM | WEIGHT: 119.93 LBS | HEART RATE: 64 BPM | DIASTOLIC BLOOD PRESSURE: 71 MMHG | OXYGEN SATURATION: 100 %

## 2025-04-15 DIAGNOSIS — Z98.891 HISTORY OF UTERINE SCAR FROM PREVIOUS SURGERY: Chronic | ICD-10-CM

## 2025-04-15 LAB
ALBUMIN SERPL ELPH-MCNC: 4.4 G/DL — SIGNIFICANT CHANGE UP (ref 3.3–5)
ALP SERPL-CCNC: 102 U/L — SIGNIFICANT CHANGE UP (ref 40–120)
ALT FLD-CCNC: 14 U/L — SIGNIFICANT CHANGE UP (ref 10–45)
ANION GAP SERPL CALC-SCNC: 12 MMOL/L — SIGNIFICANT CHANGE UP (ref 5–17)
AST SERPL-CCNC: 16 U/L — SIGNIFICANT CHANGE UP (ref 10–40)
BASOPHILS # BLD AUTO: 0.08 K/UL — SIGNIFICANT CHANGE UP (ref 0–0.2)
BASOPHILS NFR BLD AUTO: 1.1 % — SIGNIFICANT CHANGE UP (ref 0–2)
BILIRUB SERPL-MCNC: 0.2 MG/DL — SIGNIFICANT CHANGE UP (ref 0.2–1.2)
BUN SERPL-MCNC: 21 MG/DL — SIGNIFICANT CHANGE UP (ref 7–23)
CALCIUM SERPL-MCNC: 9.5 MG/DL — SIGNIFICANT CHANGE UP (ref 8.4–10.5)
CHLORIDE SERPL-SCNC: 106 MMOL/L — SIGNIFICANT CHANGE UP (ref 96–108)
CO2 SERPL-SCNC: 23 MMOL/L — SIGNIFICANT CHANGE UP (ref 22–31)
CREAT SERPL-MCNC: 0.85 MG/DL — SIGNIFICANT CHANGE UP (ref 0.5–1.3)
D DIMER BLD IA.RAPID-MCNC: <150 NG/ML DDU — SIGNIFICANT CHANGE UP
EGFR: 79 ML/MIN/1.73M2 — SIGNIFICANT CHANGE UP
EGFR: 79 ML/MIN/1.73M2 — SIGNIFICANT CHANGE UP
EOSINOPHIL # BLD AUTO: 0.13 K/UL — SIGNIFICANT CHANGE UP (ref 0–0.5)
EOSINOPHIL NFR BLD AUTO: 1.9 % — SIGNIFICANT CHANGE UP (ref 0–6)
GLUCOSE SERPL-MCNC: 89 MG/DL — SIGNIFICANT CHANGE UP (ref 70–99)
HCT VFR BLD CALC: 40.6 % — SIGNIFICANT CHANGE UP (ref 34.5–45)
HGB BLD-MCNC: 13.6 G/DL — SIGNIFICANT CHANGE UP (ref 11.5–15.5)
IMM GRANULOCYTES NFR BLD AUTO: 0.3 % — SIGNIFICANT CHANGE UP (ref 0–0.9)
LIDOCAIN IGE QN: 33 U/L — SIGNIFICANT CHANGE UP (ref 7–60)
LYMPHOCYTES # BLD AUTO: 2.52 K/UL — SIGNIFICANT CHANGE UP (ref 1–3.3)
LYMPHOCYTES # BLD AUTO: 36.2 % — SIGNIFICANT CHANGE UP (ref 13–44)
MCHC RBC-ENTMCNC: 28.9 PG — SIGNIFICANT CHANGE UP (ref 27–34)
MCHC RBC-ENTMCNC: 33.5 G/DL — SIGNIFICANT CHANGE UP (ref 32–36)
MCV RBC AUTO: 86.2 FL — SIGNIFICANT CHANGE UP (ref 80–100)
MONOCYTES # BLD AUTO: 0.68 K/UL — SIGNIFICANT CHANGE UP (ref 0–0.9)
MONOCYTES NFR BLD AUTO: 9.8 % — SIGNIFICANT CHANGE UP (ref 2–14)
NEUTROPHILS # BLD AUTO: 3.54 K/UL — SIGNIFICANT CHANGE UP (ref 1.8–7.4)
NEUTROPHILS NFR BLD AUTO: 50.7 % — SIGNIFICANT CHANGE UP (ref 43–77)
NRBC BLD AUTO-RTO: 0 /100 WBCS — SIGNIFICANT CHANGE UP (ref 0–0)
NT-PROBNP SERPL-SCNC: <36 PG/ML — SIGNIFICANT CHANGE UP (ref 0–300)
PLATELET # BLD AUTO: 329 K/UL — SIGNIFICANT CHANGE UP (ref 150–400)
POTASSIUM SERPL-MCNC: 4 MMOL/L — SIGNIFICANT CHANGE UP (ref 3.5–5.3)
POTASSIUM SERPL-SCNC: 4 MMOL/L — SIGNIFICANT CHANGE UP (ref 3.5–5.3)
PROT SERPL-MCNC: 7.1 G/DL — SIGNIFICANT CHANGE UP (ref 6–8.3)
RBC # BLD: 4.71 M/UL — SIGNIFICANT CHANGE UP (ref 3.8–5.2)
RBC # FLD: 13.3 % — SIGNIFICANT CHANGE UP (ref 10.3–14.5)
SODIUM SERPL-SCNC: 141 MMOL/L — SIGNIFICANT CHANGE UP (ref 135–145)
TROPONIN T, HIGH SENSITIVITY RESULT: <6 NG/L — SIGNIFICANT CHANGE UP (ref 0–51)
WBC # BLD: 6.97 K/UL — SIGNIFICANT CHANGE UP (ref 3.8–10.5)
WBC # FLD AUTO: 6.97 K/UL — SIGNIFICANT CHANGE UP (ref 3.8–10.5)

## 2025-04-15 PROCEDURE — 93010 ELECTROCARDIOGRAM REPORT: CPT

## 2025-04-15 PROCEDURE — 99223 1ST HOSP IP/OBS HIGH 75: CPT

## 2025-04-15 PROCEDURE — 71046 X-RAY EXAM CHEST 2 VIEWS: CPT | Mod: 26

## 2025-04-15 RX ORDER — ACETAMINOPHEN 500 MG/5ML
1000 LIQUID (ML) ORAL ONCE
Refills: 0 | Status: COMPLETED | OUTPATIENT
Start: 2025-04-15 | End: 2025-04-15

## 2025-04-15 RX ADMIN — Medication 400 MILLIGRAM(S): at 14:58

## 2025-04-15 NOTE — ED PROVIDER NOTE - PHYSICAL EXAMINATION
Diana Perry DO (PGY1)   Physical Exam:    Gen: NAD, AOx3  Head: NCAT  HEENT: EOMI, PEERLA  Lung: CTAB, no respiratory distress, no wheezes/rhonchi/rales B/L  CV: RRR, no murmurs, rubs or gallops, chest wall tenderness on the left ribs  Abd: soft, NT, ND, no guarding, no rigidity, no rebound tenderness, no CVA tenderness   MSK: no visible deformities, ROM normal in UE/LE, no back pain  Neuro: No focal sensory or motor deficits. Sensation intact to light touch all extremities.  Skin: Warm, well perfused, no rash, no leg swelling  Psych: normal affect, calm

## 2025-04-15 NOTE — ED ADULT NURSE NOTE - NSICDXPASTMEDICALHX_GEN_ALL_CORE_FT
PAST MEDICAL HISTORY:  Basal cell carcinoma (BCC) in situ of skin excised from ear in dermatologist office 1/2018    Chest pain after activity, started in 1/18, had cardiology workup 2/12/18, awaiting results    Diverticulitis hospitalized in 2010, no further episodes    Dry eye

## 2025-04-15 NOTE — ED CDU PROVIDER DISPOSITION NOTE - PATIENT PORTAL LINK FT
You can access the FollowMyHealth Patient Portal offered by [facility] by registering at the following website: [email or web address]. By joining [facility]’s FollowMyHealth portal, you will also be able to view your health information using other applications (apps) compatible with our system.

## 2025-04-15 NOTE — ED CDU PROVIDER DISPOSITION NOTE - CLINICAL COURSE
59-year-old female with past medical history of diverticulitis, intradural extramedullary spinal tumor s/p resection presents to the ED with complaints of chest pain and shortness of breath for the past 5 days.  States that the pain started while she was in [place], noted that she flew to [place] 1 week ago.  Notes that she for started noticing the pain after a long walk.  States that the pain is dull and pressure-like and starts on the left side of the chest and radiates to the left shoulder.  Reports that the pain is worse when she takes deep breath, and is worse after exercise.  Also points to pain below the sternum.  As per  who is at bedside, patient was choking 2 weeks ago and the  perform the Heimlich maneuver wrapping his arms around her ribs as well as pushing under the sternum.  Reports that the pain is better with rest. .  Says that while she was there, she went to the emergency room and got blood work, EKG and x-ray done that was unremarkable. Denies any fevers, chills, vomiting, abdominal pain, dysuria.  In the ED, pt with stable VS. Labs non actionable, trop negative, ddimer negative. EKG sinus mariel. CXR w/o acute pathology. Plan for CDU for cntd cardiac w/u including telemetry, ECHO and CTC.  In the CDU*** 59-year-old female with past medical history of diverticulitis, intradural extramedullary spinal tumor s/p resection presents to the ED with complaints of chest pain and shortness of breath for the past 5 days.  States that the pain started while she was in [place], noted that she flew to [place] 1 week ago.  Notes that she for started noticing the pain after a long walk.  States that the pain is dull and pressure-like and starts on the left side of the chest and radiates to the left shoulder.  Reports that the pain is worse when she takes deep breath, and is worse after exercise.  Also points to pain below the sternum.  As per  who is at bedside, patient was choking 2 weeks ago and the  perform the Heimlich maneuver wrapping his arms around her ribs as well as pushing under the sternum.  Reports that the pain is better with rest. .  Says that while she was there, she went to the emergency room and got blood work, EKG and x-ray done that was unremarkable. Denies any fevers, chills, vomiting, abdominal pain, dysuria.  In the ED, pt with stable VS. Labs non actionable, trop negative, ddimer negative. EKG sinus mariel. CXR w/o acute pathology. Plan for CDU for cntd cardiac w/u including telemetry, ECHO and CTC.  In the CDU Patient had CT coronary performed.  Study resulted without findings suggestive of severe stenosis, calcium score 0.  Patient echo non-actionable.  Discussed with Dr. Yang who feels comfortable discharge home patient to follow-up with her outpatient cardiologist in the office.

## 2025-04-15 NOTE — ED CDU PROVIDER INITIAL DAY NOTE - OBJECTIVE STATEMENT
59-year-old female with past medical history of diverticulitis, intradural extramedullary spinal tumor s/p resection presents to the ED with complaints of chest pain and shortness of breath for the past 5 days.  States that the pain started while she was in [place], noted that she flew to [place] 1 week ago.  Notes that she for started noticing the pain after a long walk.  States that the pain is dull and pressure-like and starts on the left side of the chest and radiates to the left shoulder.  Reports that the pain is worse when she takes deep breath, and is worse after exercise.  Also points to pain below the sternum.  As per  who is at bedside, patient was choking 2 weeks ago and the  perform the Heimlich maneuver wrapping his arms around her ribs as well as pushing under the sternum.  Reports that the pain is better with rest. .  Says that while she was there, she went to the emergency room and got blood work, EKG and x-ray done that was unremarkable. Denies any fevers, chills, vomiting, abdominal pain, dysuria. No personal or fam hx of CAD/MI.

## 2025-04-15 NOTE — ED PROVIDER NOTE - CLINICAL SUMMARY MEDICAL DECISION MAKING FREE TEXT BOX
59-year-old female with past medical history of diverticulitis, intradural extramedullary spinal tumor s/p resection presents to the ED with complaints of chest pain and shortness of breath for the past 5 days.  States that the pain started while she was in [place], noted that she flew to [place] 1 week ago.  Notes that she for started noticing the pain after a long walk.  States that the pain is dull and pressure-like and starts on the left side of the chest and radiates to the left shoulder.  Reports that the pain is worse when she takes deep breath, and is worse after exercise.  Also points to pain below the sternum.  As per  who is at bedside, patient was choking 2 weeks ago and the  perform the Heimlich maneuver wrapping his arms around her ribs as well as pushing under the sternum.  Reports that the pain is better with rest. .  Says that while she was there, she went to the emergency room and got blood work, EKG and x-ray done that was unremarkable. Denies any fevers, chills, vomiting, abdominal pain, dysuria.    Vitals within normal limits and nonactionable.  Physical exam reveals well-appearing female, not in acute distress.  Left rib tenderness noted on exam that reproduces pain as well as sternal tenderness.  Lungs sound clear to auscultation bilaterally, no abdominal tenderness.  EKG shows sinus bradycardia at a rate of 59 bpm. Differential includes but not limited to costochondritis versus pulmonary embolism versus pneumothorax versus gastritis versus pancreatitis.  Less likely ACS given negative workup by prior hospital and normal EKG. will obtain labs including troponin, lipase and D-dimer.  Will obtain chest x-ray to evaluate for a pneumothorax.  Will treat with Ofirmev and reassess.  Dispo pending labs, can consider CDU for further cardiac workup.

## 2025-04-15 NOTE — ED CDU PROVIDER DISPOSITION NOTE - NSFOLLOWUPINSTRUCTIONS_ED_ALL_ED_FT
Please make sure to follow up with your primary care doctor within 1-2 days and with the CARDIOLOGY specialist. The information for follow up can be found below. Bring a copy of all of your results with you to your follow up appointments.   Return to the ER as discussed if you develop any new or worsening symptoms.     CARDIOLOGY: 1. Please follow up with your Primary Care Doctor after discharge, bring a copy of your results to follow up appointment for review     2. Please rest, stay hydrated and continue all at home medications as previously prescribed    3. Additionally, recommend follow up with your Cardiologist Dr. Clarke after discharge for reevaluation and continued management and to discuss results of CT and Echo performed in the ED. Please call office to schedule appointment    4. Return to ED for any new or worsened symptoms of concern

## 2025-04-15 NOTE — ED ADULT NURSE NOTE - OBJECTIVE STATEMENT
59y f pt with significant other at bedside c/o chest pain since friday; pt states was in [place] when started; today couldn't walk up steps; was so out of breath; normally very fit; no cardiac hx; aox3; no resp distress; no abd pain; no n/v/d; denies fever/chills; no known sick contacts; no le swelling; pt had cardiac stress test years ago; no young cardiac issues in family; ekg done; pt on cardiac monitor in nsr; iv placed; labs drawn; pt medicated per order

## 2025-04-15 NOTE — ED CDU PROVIDER INITIAL DAY NOTE - PROGRESS NOTE DETAILS
CDU PROGRESS NOTE ARMANI GROSS: Received pt at 1900 sign-out. Case/plan reviewed. Pt resting in stretcher in NAD. VSS. Pt ate dinner. Ambulatory around unit with steady gait. S1 S2 noted, RRR, lungs CTA b/l, BS x4 with soft, nontender abdomen. Pt without complaints. Will continue to monitor overnight.

## 2025-04-15 NOTE — ED CLERICAL - BED REQUESTED
No stool testing orders in chart. Routing to provider for review.  Simone GUZMÁN, CMA     15-Apr-2025 17:43

## 2025-04-15 NOTE — ED PROVIDER NOTE - ATTENDING CONTRIBUTION TO CARE
Attending MD Concepcion:  I have seen and examined this patient and fully participated in the care of this patient as the teaching attending. I personally made/approved the management plan and take responsibility for the patient management.      Patient presents to the emergency department with complaints of chest pain and shortness of breath. The pain is described as a "dull pain" that worsens with movement and exercise. Patient reports feeling the pain particularly when coughing. The pain radiates to the shoulder and the top of the abdomen. Patient notes that the pain is not constant but is worse with physical activity. Patient reports that when walking up steps, she had to sit down due to exhaustion and breathing difficulties. Patient states she has not been feeling "a hundred percent" for a while. Patient also reports experiencing nausea, particularly severe on Friday when she was trying to vomit but couldn't. Patient took ibuprofen at approximately 6:30 AM this morning before boarding a plane from [place], which provided some relief. Patient reports feeling "a little better" when lying still. Patient took a COVID test which was negative. Patient denies fever, chills, diarrhea, or vomiting.    Past Medical History (may not be comprehensive): History of atrial fibrillation before COVID, which resolved without intervention. Spinal tumor removed in 2018.    Allergies: None reported    Medication History (may not be comprehensive): No regular medications reported    Social History: Denies cigarette smoking. Reports marijuana use.    Review of Systems:  - Constitutional symptoms: Denies fever, chills  - Cardiovascular: Chest pain, shortness of breath  - Respiratory: Shortness of breath, pain with coughing  - Gastrointestinal: Nausea, abdominal pain at the top of the abdomen. Denies vomiting, diarrhea.  - Musculoskeletal: Pain radiating to shoulder        Patient vital signs nonactionable patient sitting in the stretcher no apparent acute distress no increased work of breathing clear lungs anteriorly regular heart sounds abdomen is soft nondistended nontender no extremity swelling no calf tenderness bilaterally.    ECG recorded at 1:42 PM independently interpreted by me , Dr Eugene Concepcion,  at [2:50 PM] shows [normal sinus rhythm normal axis normal intervals no acute diagnostic ischemic ST-T changes    ED POCUS reveals no pericardial effusion grossly preserved ejection fraction.]    Considerations in this patient include but are not fully limited to ACS, pulmonary embolism, symptomatic anemia, less likely acute heart failure.  Plan will be to obtain cardiac biomarkers D-dimer to assess for possible PE chest film maintain on telemetry to screen for dysrhythmia and reassess.  Will likely consider further cardiac ischemic workup if above workup is unrevealing.          *The above represents an initial assessment/impression. Please refer to progress notes for potential changes in patient clinical course*

## 2025-04-15 NOTE — ED CDU PROVIDER INITIAL DAY NOTE - ATTENDING APP SHARED VISIT CONTRIBUTION OF CARE
Attending MD Concepcion: I personally made/approved the management plan and take responsibility for the patient management.          *The above represents an initial assessment/impression. Please refer to progress notes for potential changes in patient clinical course*

## 2025-04-16 ENCOUNTER — RESULT REVIEW (OUTPATIENT)
Age: 60
End: 2025-04-16

## 2025-04-16 VITALS
HEART RATE: 52 BPM | DIASTOLIC BLOOD PRESSURE: 62 MMHG | OXYGEN SATURATION: 98 % | SYSTOLIC BLOOD PRESSURE: 92 MMHG | RESPIRATION RATE: 18 BRPM | TEMPERATURE: 98 F

## 2025-04-16 PROCEDURE — 93356 MYOCRD STRAIN IMG SPCKL TRCK: CPT

## 2025-04-16 PROCEDURE — 71046 X-RAY EXAM CHEST 2 VIEWS: CPT

## 2025-04-16 PROCEDURE — 85025 COMPLETE CBC W/AUTO DIFF WBC: CPT

## 2025-04-16 PROCEDURE — 80061 LIPID PANEL: CPT

## 2025-04-16 PROCEDURE — 96375 TX/PRO/DX INJ NEW DRUG ADDON: CPT

## 2025-04-16 PROCEDURE — 83880 ASSAY OF NATRIURETIC PEPTIDE: CPT

## 2025-04-16 PROCEDURE — 99285 EMERGENCY DEPT VISIT HI MDM: CPT | Mod: 25

## 2025-04-16 PROCEDURE — 93306 TTE W/DOPPLER COMPLETE: CPT | Mod: 26

## 2025-04-16 PROCEDURE — 75574 CT ANGIO HRT W/3D IMAGE: CPT | Mod: MC

## 2025-04-16 PROCEDURE — 93005 ELECTROCARDIOGRAM TRACING: CPT

## 2025-04-16 PROCEDURE — 83036 HEMOGLOBIN GLYCOSYLATED A1C: CPT

## 2025-04-16 PROCEDURE — 96376 TX/PRO/DX INJ SAME DRUG ADON: CPT

## 2025-04-16 PROCEDURE — 99238 HOSP IP/OBS DSCHRG MGMT 30/<: CPT

## 2025-04-16 PROCEDURE — 85379 FIBRIN DEGRADATION QUANT: CPT

## 2025-04-16 PROCEDURE — G0378: CPT

## 2025-04-16 PROCEDURE — 96374 THER/PROPH/DIAG INJ IV PUSH: CPT | Mod: XU

## 2025-04-16 PROCEDURE — 80053 COMPREHEN METABOLIC PANEL: CPT

## 2025-04-16 PROCEDURE — 75574 CT ANGIO HRT W/3D IMAGE: CPT | Mod: 26

## 2025-04-16 PROCEDURE — 93308 TTE F-UP OR LMTD: CPT

## 2025-04-16 PROCEDURE — 84484 ASSAY OF TROPONIN QUANT: CPT

## 2025-04-16 PROCEDURE — 83690 ASSAY OF LIPASE: CPT

## 2025-04-16 PROCEDURE — 93306 TTE W/DOPPLER COMPLETE: CPT

## 2025-04-16 RX ORDER — MELATONIN 5 MG
5 TABLET ORAL AT BEDTIME
Refills: 0 | Status: ACTIVE | OUTPATIENT
Start: 2025-04-16 | End: 2026-03-15

## 2025-04-16 RX ORDER — ACETAMINOPHEN 500 MG/5ML
1000 LIQUID (ML) ORAL ONCE
Refills: 0 | Status: COMPLETED | OUTPATIENT
Start: 2025-04-16 | End: 2025-04-16

## 2025-04-16 RX ORDER — ONDANSETRON HCL/PF 4 MG/2 ML
4 VIAL (ML) INJECTION ONCE
Refills: 0 | Status: COMPLETED | OUTPATIENT
Start: 2025-04-16 | End: 2025-04-16

## 2025-04-16 RX ADMIN — Medication 1000 MILLILITER(S): at 10:56

## 2025-04-16 RX ADMIN — Medication 1000 MILLILITER(S): at 15:58

## 2025-04-16 RX ADMIN — Medication 5 MILLIGRAM(S): at 00:54

## 2025-04-16 RX ADMIN — Medication 4 MILLIGRAM(S): at 15:57

## 2025-04-16 RX ADMIN — Medication 1000 MILLILITER(S): at 09:39

## 2025-04-16 RX ADMIN — Medication 400 MILLIGRAM(S): at 15:58

## 2025-04-16 NOTE — ED CDU PROVIDER SUBSEQUENT DAY NOTE - PROGRESS NOTE DETAILS
pt currently without complaints.  Awaiting ECHO and CT Coronary Angiogram results. Patient had CT coronary performed.  Study resulted without findings suggestive of severe stenosis, calcium score 0.  Patient echo non-actionable.  Discussed with Dr. Ynag who feels comfortable discharge home patient to follow-up with her outpatient cardiologist in the office.  Kady Chun PA-C

## 2025-04-16 NOTE — ED ADULT NURSE REASSESSMENT NOTE - NS ED NURSE REASSESS COMMENT FT1
0355 Received the Pt from  BOOKER Freeman Pt is Observed for CP /SOB for ECHO / CTC. Received the Pt A&OX 4  Pt obeys commands Lydia N/V/D fever chills cp SOB now  Comfort care & safety measures continued  IV site looks clean & dry no signs of infiltration noted pt denies  pain IV site .Pt is oriented to the unit Plan of care explained .  Pt is advised to call for help  call bell with in the reach pt verbalized the understanding .  pending CDU  MD decker . GCS 15/15 A&OX 4 PERRLA  size 3 Strong upper & lower extremities steady gait  Pt is ambulatory & independent   No facial droop  No Hand Leg drop denies numbness tingling  PT is  sinus Adrien  66/mt on the Cardiac Monitor  Plan of care ongoing
Pt received from BOOKER Belle. Pt oriented to CDU & plan of care was discussed. Pt A&O x 4. Independent. Pt in CDU for telemetry, CT coronaries, and TTE . Pt denies any chest pain, SOB, dizziness or palpitations at this time. V/S stable, pt afebrile, Right AC 18g  IV in place, patent and free of signs of infiltration. Pt resting in bed. Safety & comfort measures maintained. Call bell in reach. Care continues.
07.00 Received the Pt from  BOOKER Acosta Pt is Observed for CP for ECHO & CTC. Received the Pt A&OX 4  Pt obeys commands Lydia N/V/D fever chills cp SOB   Comfort care & safety measures continued  IV site looks clean & dry no signs of infiltration noted pt denies  pain IV site .Pt is oriented to the unit Plan of care explained .  Pt is advised to call for help  call bell with in the reach pt verbalized the understanding .pending CDU  MD decker . GCS 15/15 A&OX 4 PERRLA  size 3 Strong upper & lower extremities steady gait  Pt is ambulatory & independent   No facial droop  No Hand Leg drop denies numbness tingling  Plan of care ongoing

## 2025-04-16 NOTE — ED CDU PROVIDER SUBSEQUENT DAY NOTE - HISTORY
CDU PROGRESS NOTE PA GINNY: Pt resting comfortably, feeling well without complaint. NAD, VSS. No events on telemetry.

## 2025-04-16 NOTE — ED CDU PROVIDER SUBSEQUENT DAY NOTE - EYES NEGATIVE STATEMENT, MLM
Abdomen soft, non-tender, no guarding. no discharge, no irritation, no pain, no redness, and no visual changes.

## 2025-04-16 NOTE — ED ADULT NURSE REASSESSMENT NOTE - NSFALLUNIVINTERV_ED_ALL_ED
Bed/Stretcher in lowest position, wheels locked, appropriate side rails in place/Call bell, personal items and telephone in reach/Instruct patient to call for assistance before getting out of bed/chair/stretcher/Non-slip footwear applied when patient is off stretcher/[place] to call system/Physically safe environment - no spills, clutter or unnecessary equipment/Purposeful proactive rounding/Room/bathroom lighting operational, light cord in reach

## 2025-04-18 PROCEDURE — 93308 TTE F-UP OR LMTD: CPT | Mod: 26

## 2025-06-09 ENCOUNTER — NON-APPOINTMENT (OUTPATIENT)
Age: 60
End: 2025-06-09

## 2025-06-09 ENCOUNTER — APPOINTMENT (OUTPATIENT)
Dept: SPINE | Facility: CLINIC | Age: 60
End: 2025-06-09
Payer: COMMERCIAL

## 2025-06-09 VITALS
BODY MASS INDEX: 23.56 KG/M2 | OXYGEN SATURATION: 97 % | HEIGHT: 60 IN | HEART RATE: 69 BPM | SYSTOLIC BLOOD PRESSURE: 109 MMHG | DIASTOLIC BLOOD PRESSURE: 69 MMHG | WEIGHT: 120 LBS

## 2025-06-09 PROCEDURE — 99203 OFFICE O/P NEW LOW 30 MIN: CPT

## 2025-07-02 ENCOUNTER — APPOINTMENT (OUTPATIENT)
Dept: MRI IMAGING | Facility: CLINIC | Age: 60
End: 2025-07-02

## 2025-07-02 ENCOUNTER — OUTPATIENT (OUTPATIENT)
Dept: OUTPATIENT SERVICES | Facility: HOSPITAL | Age: 60
LOS: 1 days | End: 2025-07-02
Payer: COMMERCIAL

## 2025-07-02 DIAGNOSIS — D32.1 BENIGN NEOPLASM OF SPINAL MENINGES: ICD-10-CM

## 2025-07-02 DIAGNOSIS — Z98.891 HISTORY OF UTERINE SCAR FROM PREVIOUS SURGERY: Chronic | ICD-10-CM

## 2025-07-02 PROCEDURE — 72157 MRI CHEST SPINE W/O & W/DYE: CPT | Mod: 26

## 2025-07-02 PROCEDURE — 72157 MRI CHEST SPINE W/O & W/DYE: CPT

## 2025-07-02 PROCEDURE — A9585: CPT

## 2025-07-14 ENCOUNTER — APPOINTMENT (OUTPATIENT)
Dept: SPINE | Facility: CLINIC | Age: 60
End: 2025-07-14
Payer: COMMERCIAL

## 2025-07-14 VITALS
DIASTOLIC BLOOD PRESSURE: 70 MMHG | HEIGHT: 60 IN | OXYGEN SATURATION: 96 % | WEIGHT: 120 LBS | HEART RATE: 68 BPM | SYSTOLIC BLOOD PRESSURE: 106 MMHG | BODY MASS INDEX: 23.56 KG/M2

## 2025-07-14 PROBLEM — D32.1 SPINAL MENINGIOMA: Status: ACTIVE | Noted: 2025-06-09

## 2025-07-14 PROCEDURE — 99213 OFFICE O/P EST LOW 20 MIN: CPT

## 2025-07-14 RX ORDER — CYCLOSPORINE 0.5 MG/ML
0.05 EMULSION OPHTHALMIC
Refills: 0 | Status: ACTIVE | COMMUNITY

## 2025-08-06 ENCOUNTER — APPOINTMENT (OUTPATIENT)
Dept: DERMATOLOGY | Facility: CLINIC | Age: 60
End: 2025-08-06
Payer: COMMERCIAL

## 2025-08-06 DIAGNOSIS — L82.1 OTHER SEBORRHEIC KERATOSIS: ICD-10-CM

## 2025-08-06 DIAGNOSIS — L81.4 OTHER MELANIN HYPERPIGMENTATION: ICD-10-CM

## 2025-08-06 DIAGNOSIS — Z12.83 ENCOUNTER FOR SCREENING FOR MALIGNANT NEOPLASM OF SKIN: ICD-10-CM

## 2025-08-06 DIAGNOSIS — D49.2 NEOPLASM OF UNSPECIFIED BEHAVIOR OF BONE, SOFT TISSUE, AND SKIN: ICD-10-CM

## 2025-08-06 DIAGNOSIS — D22.9 MELANOCYTIC NEVI, UNSPECIFIED: ICD-10-CM

## 2025-08-06 PROCEDURE — 17110 DESTRUCTION B9 LES UP TO 14: CPT

## 2025-08-06 PROCEDURE — 99213 OFFICE O/P EST LOW 20 MIN: CPT | Mod: 25
